# Patient Record
Sex: FEMALE | Race: WHITE | Employment: OTHER | URBAN - METROPOLITAN AREA
[De-identification: names, ages, dates, MRNs, and addresses within clinical notes are randomized per-mention and may not be internally consistent; named-entity substitution may affect disease eponyms.]

---

## 2023-10-25 ENCOUNTER — LAB (OUTPATIENT)
Dept: LAB | Facility: LAB | Age: 63
End: 2023-10-25
Payer: MEDICARE

## 2023-10-25 DIAGNOSIS — Z79.899 OTHER LONG TERM (CURRENT) DRUG THERAPY: Primary | ICD-10-CM

## 2023-10-25 DIAGNOSIS — E66.9 OBESITY, UNSPECIFIED: ICD-10-CM

## 2023-10-25 DIAGNOSIS — E06.3 AUTOIMMUNE THYROIDITIS: ICD-10-CM

## 2023-10-25 DIAGNOSIS — E78.49 OTHER HYPERLIPIDEMIA: ICD-10-CM

## 2023-10-25 DIAGNOSIS — E03.9 HYPOTHYROIDISM, UNSPECIFIED: ICD-10-CM

## 2023-10-25 LAB
ALBUMIN SERPL-MCNC: 4.1 G/DL (ref 3.5–5)
ALP BLD-CCNC: 91 U/L (ref 35–125)
ALT SERPL-CCNC: 11 U/L (ref 5–40)
ANION GAP SERPL CALC-SCNC: 10 MMOL/L
APPEARANCE UR: ABNORMAL
AST SERPL-CCNC: 16 U/L (ref 5–40)
BILIRUB SERPL-MCNC: 0.4 MG/DL (ref 0.1–1.2)
BILIRUB UR STRIP.AUTO-MCNC: NEGATIVE MG/DL
BUN SERPL-MCNC: 13 MG/DL (ref 8–25)
CALCIUM SERPL-MCNC: 8.6 MG/DL (ref 8.5–10.4)
CHLORIDE SERPL-SCNC: 103 MMOL/L (ref 97–107)
CHOLEST SERPL-MCNC: 255 MG/DL (ref 133–200)
CHOLEST/HDLC SERPL: 4 {RATIO}
CO2 SERPL-SCNC: 27 MMOL/L (ref 24–31)
COLOR UR: ABNORMAL
CREAT SERPL-MCNC: 0.7 MG/DL (ref 0.4–1.6)
GFR SERPL CREATININE-BSD FRML MDRD: >90 ML/MIN/1.73M*2
GLUCOSE SERPL-MCNC: 92 MG/DL (ref 65–99)
GLUCOSE UR STRIP.AUTO-MCNC: NORMAL MG/DL
HDLC SERPL-MCNC: 64 MG/DL
KETONES UR STRIP.AUTO-MCNC: NEGATIVE MG/DL
LDLC SERPL CALC-MCNC: 154 MG/DL (ref 65–130)
LEUKOCYTE ESTERASE UR QL STRIP.AUTO: NEGATIVE
NITRITE UR QL STRIP.AUTO: NEGATIVE
PH UR STRIP.AUTO: 5.5 [PH]
POTASSIUM SERPL-SCNC: 4.3 MMOL/L (ref 3.4–5.1)
PROT SERPL-MCNC: 6.4 G/DL (ref 5.9–7.9)
PROT UR STRIP.AUTO-MCNC: NEGATIVE MG/DL
RBC # UR STRIP.AUTO: NEGATIVE /UL
SODIUM SERPL-SCNC: 140 MMOL/L (ref 133–145)
SP GR UR STRIP.AUTO: 1.01
TRIGL SERPL-MCNC: 184 MG/DL (ref 40–150)
TSH SERPL DL<=0.05 MIU/L-ACNC: 3.47 MIU/L (ref 0.27–4.2)
UROBILINOGEN UR STRIP.AUTO-MCNC: NORMAL MG/DL

## 2023-10-25 PROCEDURE — 80053 COMPREHEN METABOLIC PANEL: CPT

## 2023-10-25 PROCEDURE — 80061 LIPID PANEL: CPT

## 2023-10-25 PROCEDURE — 81003 URINALYSIS AUTO W/O SCOPE: CPT

## 2023-10-25 PROCEDURE — 36415 COLL VENOUS BLD VENIPUNCTURE: CPT

## 2023-10-25 PROCEDURE — 84443 ASSAY THYROID STIM HORMONE: CPT

## 2023-11-05 PROBLEM — R51.9 HEADACHE: Status: ACTIVE | Noted: 2023-11-05

## 2023-11-05 PROBLEM — H92.11 EAR DRAINAGE RIGHT: Status: ACTIVE | Noted: 2023-11-05

## 2023-11-05 PROBLEM — E06.3 AUTOIMMUNE THYROIDITIS: Status: ACTIVE | Noted: 2018-06-08

## 2023-11-05 PROBLEM — R31.9 HEMATURIA, UNSPECIFIED: Status: ACTIVE | Noted: 2019-12-18

## 2023-11-05 PROBLEM — H93.8X9 EAR PRESSURE: Status: ACTIVE | Noted: 2023-11-05

## 2023-11-05 PROBLEM — H92.02 REFERRED OTALGIA OF LEFT EAR: Status: ACTIVE | Noted: 2023-11-05

## 2023-11-05 PROBLEM — Z90.79 S/P TAH-BSO: Status: ACTIVE | Noted: 2018-10-04

## 2023-11-05 PROBLEM — E06.3 HASHIMOTO'S DISEASE: Status: ACTIVE | Noted: 2019-05-16

## 2023-11-05 PROBLEM — R11.10 VOMITING: Status: ACTIVE | Noted: 2021-05-06

## 2023-11-05 PROBLEM — N39.0 URINARY TRACT INFECTION: Status: ACTIVE | Noted: 2019-12-18

## 2023-11-05 PROBLEM — Z90.722 S/P TAH-BSO: Status: ACTIVE | Noted: 2018-10-04

## 2023-11-05 PROBLEM — R05.9 COUGH: Status: ACTIVE | Noted: 2018-08-13

## 2023-11-05 PROBLEM — N94.89 VAGINAL BURNING: Status: ACTIVE | Noted: 2019-12-26

## 2023-11-05 PROBLEM — M25.511 RIGHT SHOULDER PAIN: Status: ACTIVE | Noted: 2019-04-22

## 2023-11-05 PROBLEM — E78.5 HYPERLIPIDEMIA: Status: ACTIVE | Noted: 2018-06-08

## 2023-11-05 PROBLEM — R07.81 RIB PAIN ON RIGHT SIDE: Status: ACTIVE | Noted: 2021-10-21

## 2023-11-05 PROBLEM — J38.01 VOCAL CORD PARALYSIS, UNILATERAL COMPLETE: Status: ACTIVE | Noted: 2023-11-05

## 2023-11-05 PROBLEM — L08.9 PUSTULE: Status: ACTIVE | Noted: 2021-12-06

## 2023-11-05 PROBLEM — D44.7 GLOMUS JUGULARE TUMOR (MULTI): Status: ACTIVE | Noted: 2019-10-10

## 2023-11-05 PROBLEM — J11.2 INFLUENZA WITH GASTROINTESTINAL TRACT INVOLVEMENT: Status: ACTIVE | Noted: 2023-11-05

## 2023-11-05 PROBLEM — M26.642 ARTHRITIS OF LEFT TEMPOROMANDIBULAR JOINT: Status: ACTIVE | Noted: 2023-11-05

## 2023-11-05 PROBLEM — R51.9 TEMPORAL PAIN: Status: ACTIVE | Noted: 2023-11-05

## 2023-11-05 PROBLEM — R49.0 HOARSENESS OF VOICE: Status: ACTIVE | Noted: 2023-11-05

## 2023-11-05 PROBLEM — E03.9 HYPOTHYROIDISM: Status: ACTIVE | Noted: 2018-06-08

## 2023-11-05 PROBLEM — N94.9 VAGINAL BURNING: Status: ACTIVE | Noted: 2019-12-26

## 2023-11-05 PROBLEM — S31.109A WOUND OF RIGHT GROIN: Status: ACTIVE | Noted: 2021-12-06

## 2023-11-05 PROBLEM — R09.89 CHEST CONGESTION: Status: ACTIVE | Noted: 2019-05-23

## 2023-11-05 PROBLEM — R07.0 THROAT PAIN: Status: ACTIVE | Noted: 2022-01-26

## 2023-11-05 PROBLEM — N89.8 VAGINAL ITCHING: Status: ACTIVE | Noted: 2019-12-26

## 2023-11-05 PROBLEM — J01.00 ACUTE MAXILLARY SINUSITIS: Status: ACTIVE | Noted: 2021-10-21

## 2023-11-05 PROBLEM — R09.81 SINUS CONGESTION: Status: ACTIVE | Noted: 2018-08-13

## 2023-11-05 PROBLEM — H90.3 SENSORINEURAL HEARING LOSS, BILATERAL: Status: ACTIVE | Noted: 2023-11-05

## 2023-11-05 PROBLEM — D18.00 GLOMUS TUMOR: Status: ACTIVE | Noted: 2023-11-05
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
## 2023-11-05 PROBLEM — R13.14 PHARYNGOESOPHAGEAL PHASE DYSPHAGIA: Status: ACTIVE | Noted: 2023-11-05

## 2023-11-05 PROBLEM — H83.3X3: Status: ACTIVE | Noted: 2023-11-05

## 2023-11-05 PROBLEM — E66.9 OBESITY: Status: ACTIVE | Noted: 2018-06-08

## 2023-11-05 PROBLEM — Z90.710 S/P TAH-BSO: Status: ACTIVE | Noted: 2018-10-04
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
## 2023-11-05 PROBLEM — J30.2 SEASONAL ALLERGIES: Status: ACTIVE | Noted: 2021-10-13

## 2023-11-05 RX ORDER — ONDANSETRON 4 MG/1
4 TABLET, ORALLY DISINTEGRATING ORAL EVERY 6 HOURS PRN
COMMUNITY
Start: 2023-04-14 | End: 2023-11-08

## 2023-11-05 RX ORDER — BEPOTASTINE BESILATE 15 MG/ML
1 SOLUTION/ DROPS OPHTHALMIC 2 TIMES DAILY
COMMUNITY
Start: 2019-06-05

## 2023-11-05 RX ORDER — CLOBETASOL PROPIONATE 0.5 MG/G
1 CREAM TOPICAL
COMMUNITY
Start: 2015-04-22

## 2023-11-05 RX ORDER — FLUCONAZOLE 150 MG/1
TABLET ORAL
COMMUNITY
End: 2023-11-17 | Stop reason: SDUPTHER

## 2023-11-05 RX ORDER — LEVOTHYROXINE SODIUM 75 UG/1
75 TABLET ORAL
COMMUNITY
Start: 2016-04-28 | End: 2023-11-07 | Stop reason: DRUGHIGH

## 2023-11-05 RX ORDER — DOXYCYCLINE 100 MG/1
100 TABLET ORAL 2 TIMES DAILY
COMMUNITY
Start: 2018-03-23 | End: 2023-12-04 | Stop reason: ALTCHOICE

## 2023-11-05 RX ORDER — FLUTICASONE PROPIONATE 50 MCG
2 SPRAY, SUSPENSION (ML) NASAL DAILY
COMMUNITY
Start: 2023-06-30 | End: 2024-02-12 | Stop reason: SDUPTHER

## 2023-11-05 RX ORDER — LEVOTHYROXINE SODIUM 88 UG/1
88 TABLET ORAL DAILY
COMMUNITY
End: 2023-11-07 | Stop reason: SDUPTHER

## 2023-11-05 RX ORDER — DICYCLOMINE HYDROCHLORIDE 20 MG/1
20 TABLET ORAL EVERY 6 HOURS PRN
COMMUNITY
Start: 2023-04-14

## 2023-11-07 ENCOUNTER — OFFICE VISIT (OUTPATIENT)
Dept: PRIMARY CARE | Facility: CLINIC | Age: 63
End: 2023-11-07
Payer: MEDICARE

## 2023-11-07 VITALS
HEIGHT: 66 IN | SYSTOLIC BLOOD PRESSURE: 126 MMHG | WEIGHT: 240 LBS | OXYGEN SATURATION: 98 % | DIASTOLIC BLOOD PRESSURE: 80 MMHG | HEART RATE: 88 BPM | BODY MASS INDEX: 38.57 KG/M2

## 2023-11-07 DIAGNOSIS — D44.7 GLOMUS JUGULARE TUMOR (MULTI): ICD-10-CM

## 2023-11-07 DIAGNOSIS — J32.9 SINUSITIS, UNSPECIFIED CHRONICITY, UNSPECIFIED LOCATION: ICD-10-CM

## 2023-11-07 DIAGNOSIS — R05.1 ACUTE COUGH: Primary | ICD-10-CM

## 2023-11-07 DIAGNOSIS — E03.9 ACQUIRED HYPOTHYROIDISM: ICD-10-CM

## 2023-11-07 DIAGNOSIS — J30.2 SEASONAL ALLERGIC RHINITIS, UNSPECIFIED TRIGGER: ICD-10-CM

## 2023-11-07 DIAGNOSIS — Z00.00 WELL ADULT EXAM: ICD-10-CM

## 2023-11-07 PROCEDURE — 1159F MED LIST DOCD IN RCRD: CPT | Performed by: FAMILY MEDICINE

## 2023-11-07 PROCEDURE — G0402 INITIAL PREVENTIVE EXAM: HCPCS | Performed by: FAMILY MEDICINE

## 2023-11-07 PROCEDURE — 1126F AMNT PAIN NOTED NONE PRSNT: CPT | Performed by: FAMILY MEDICINE

## 2023-11-07 PROCEDURE — 1036F TOBACCO NON-USER: CPT | Performed by: FAMILY MEDICINE

## 2023-11-07 RX ORDER — LEVOTHYROXINE SODIUM 88 UG/1
88 TABLET ORAL DAILY
Qty: 90 TABLET | Refills: 3 | Status: SHIPPED | OUTPATIENT
Start: 2023-11-07 | End: 2024-05-01

## 2023-11-07 RX ORDER — DOXYCYCLINE 100 MG/1
100 CAPSULE ORAL 2 TIMES DAILY
Qty: 14 CAPSULE | Refills: 0 | Status: SHIPPED | OUTPATIENT
Start: 2023-11-07 | End: 2023-11-14

## 2023-11-07 RX ORDER — BENZONATATE 100 MG/1
100 CAPSULE ORAL 3 TIMES DAILY PRN
Qty: 42 CAPSULE | Refills: 0 | Status: SHIPPED | OUTPATIENT
Start: 2023-11-07 | End: 2023-12-04 | Stop reason: SDUPTHER

## 2023-11-07 ASSESSMENT — PATIENT HEALTH QUESTIONNAIRE - PHQ9
1. LITTLE INTEREST OR PLEASURE IN DOING THINGS: NOT AT ALL
SUM OF ALL RESPONSES TO PHQ9 QUESTIONS 1 AND 2: 0
2. FEELING DOWN, DEPRESSED OR HOPELESS: NOT AT ALL

## 2023-11-07 ASSESSMENT — PAIN SCALES - GENERAL: PAINLEVEL: 0-NO PAIN

## 2023-11-07 NOTE — PROGRESS NOTES
Subjective   Patient ID: Bhavana Jean is a 63 y.o. female who presents for Annual Exam.    HPI   Here for a comprehensive physical exam. PMH, PSH, family history and social history were reviewed and updated. Tetanus vaccination status is UTD. She declines an influenza and shingles vaccination at this time.   She is under the care of gynecology and is UTD with cervical and breast cancer screening.   She was told to not get anymore screening colonoscopies due to pancreatitis flare-up. She is checked frequently by her heme/onc physicians for colon cancer via blood work. She had an EKG 2 years ago, and it was normal. She had it completed at  prior to radiation. She had fasting labs completed recently.   Pt has Hypothyroidism: TSH normal, compliant with medication, asymptomatic, needs refill. On levothyroxine 88 micrograms daily.  Pt has Hyperlipidemia: no medication, lipid panel slightly increased this year, admits to not exercising at this time, follows a low fat diet. Has been tried on statin but it flared up her pancreatitis and she had elevated liver enzymes.  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 She is status post multiple operations with reconstructions.   Patent has seasonal allergies and uses steroid nasal spray routinely.  Review of Systems  GENERAL: denies lack of energy, unexplained weight gain or weight loss, loss of appetite, fever, night sweats.  HEENT: denies difficulty with hearing, sinus problems, runny nose, post-nasal drip, ringing in ears, mouth sores, loose teeth, ear pain, nosebleeds, sore throat, facial pain or numbness.  CV: denies irregular heartbeat, racing heart, chest pains, swelling of feet or legs, pain in legs with walking.  RESPIRATORY: denies shortness of breath, prolonged cough, wheezing, sputum production, prior tuberculosis, pleurisy, oxygen at home, coughing up blood, abnormal chest x-ray.  GI: denies heartburn, constipation,  "intolerance to certain foods, diarrhea, abdominal pain, nausea, vomiting, blood in stools, unexplained change in bowel habits, incontinence, swallowing difficulty.  : denies painful urination, frequent urination, urgency, bladder problems.  MS: denies joint pain, aching muscles, swelling of joints, joint deformities, back pain.  INTEGUMENT: denies persistent rash, itching, new skin lesion, change in existing skin lesion, hair loss or increase, breast changes.  NEUROLOGIC: denies frequent headaches, double vision, weakness, change in sensation, problems with walking or balance, dizziness, tremor, loss of consciousness, uncontrolled motions, episodes of visual loss.  PSYCHIATRIC: denies insomnia, irritability, depression, anxiety, recurrent bad thoughts.  ENDOCRINOLOGIC: denies intolerance to heat or cold, menstrual irregularities, frequent hunger/urination/thirst.  HEMATOLOGIC: denies easy bleeding, easy bruising, anemia, leukemia, unexplained swollen areas.  ALLERGIC/IMMUNOLOGIC: denies seasonal allergies, hay fever symptoms, itching, frequent infections.  Objective   /80   Pulse 88   Ht 1.664 m (5' 5.5\")   Wt 109 kg (240 lb)   LMP  (LMP Unknown)   SpO2 98%   BMI 39.33 kg/m²     Physical Exam  General appearance: Well developed, obese, in no acute distress.  Skin: Inspection of the skin reveals no rashes, ulceration, or petechiae.  HEENT: The sclerae were anicteric and conjunctivae were pink and moist. Extraocular movements were intact and pupils were equal, round, and reactive to light with normal accommodation. External inspection of the ears and nose showed no scars, lesions, or masses. EACs clear, TMs translucent, ossicles normal appearance, hearing intact. Nasal mucosa non-inflamed, turbinates normal. Lips, teeth, and gums showed normal mucosa. The oral mucosa, hard and soft palate, tongue and posterior pharynx were normal.  Neck: Supple and symmetric. There was no thyroid enlargement, and no " tenderness, or masses were felt.   Lungs: Auscultation of the lungs revealed normal breath sounds without any other adventitious sounds.  Cardiovascular: There was a regular rate and rhythm without any murmurs, gallops, or rubs. There were no carotid bruits. Peripheral pulses were 2+ and symmetric. Brisk capillary refill.  Abdomen: Soft and nontender with normal bowel sounds. The liver was not enlarged or tender. The spleen was not palpable. There was no umbilical hernia noted. No ascites was noted.  Lymph nodes: No lymphadenopathy was appreciated in the neck.  Musculoskeletal: There was no tenderness or effusions noted. Muscle strength and tone were normal.   Extremities: No cyanosis, clubbing, or edema.  Neurologic: Alert and oriented x 3. Normal affect. Normal deep tendon reflexes with no pathological reflexes. Sensation to touch was normal.   Rectal: Deferred.  Breasts: Deferred.  Gynecological: Deferred.  Assessment/Plan   Problem List Items Addressed This Visit             ICD-10-CM    Hypothyroidism  Stable.  Continue on levothyroxine. E03.9    Glomus jugulare tumor (CMS/HCC)  Chronic.  Patient sees oncology and ENT at . D44.7    Cough - Primary  Needs better control.  Benzonatate. R05.9    Relevant Medications    benzonatate (Tessalon) 100 mg capsule     Other Visit Diagnoses         Codes    Well adult exam    Normal exam. Z00.00    Sinusitis, unspecified chronicity, unspecified location    Needs better control.  Begin antibiotics J32.9    Relevant Medications    doxycycline (Vibramycin) 100 mg capsule    Seasonal allergic rhinitis, unspecified trigger    Chronic, intermittent J30.2

## 2023-11-08 ENCOUNTER — APPOINTMENT (OUTPATIENT)
Dept: RADIOLOGY | Facility: HOSPITAL | Age: 63
End: 2023-11-08
Payer: MEDICARE

## 2023-11-08 ENCOUNTER — HOSPITAL ENCOUNTER (EMERGENCY)
Facility: HOSPITAL | Age: 63
Discharge: HOME | End: 2023-11-08
Attending: STUDENT IN AN ORGANIZED HEALTH CARE EDUCATION/TRAINING PROGRAM
Payer: MEDICARE

## 2023-11-08 ENCOUNTER — TELEPHONE (OUTPATIENT)
Dept: PRIMARY CARE | Facility: CLINIC | Age: 63
End: 2023-11-08
Payer: MEDICARE

## 2023-11-08 VITALS
HEART RATE: 74 BPM | TEMPERATURE: 99 F | BODY MASS INDEX: 39.15 KG/M2 | SYSTOLIC BLOOD PRESSURE: 144 MMHG | WEIGHT: 235 LBS | OXYGEN SATURATION: 96 % | DIASTOLIC BLOOD PRESSURE: 86 MMHG | HEIGHT: 65 IN | RESPIRATION RATE: 18 BRPM

## 2023-11-08 DIAGNOSIS — R11.2 NAUSEA AND VOMITING, UNSPECIFIED VOMITING TYPE: Primary | ICD-10-CM

## 2023-11-08 DIAGNOSIS — B34.9 VIRAL ILLNESS: ICD-10-CM

## 2023-11-08 LAB
ALBUMIN SERPL-MCNC: 4.1 G/DL (ref 3.5–5)
ALP BLD-CCNC: 100 U/L (ref 35–125)
ALT SERPL-CCNC: 15 U/L (ref 5–40)
ANION GAP SERPL CALC-SCNC: 11 MMOL/L
AST SERPL-CCNC: 18 U/L (ref 5–40)
BASOPHILS # BLD AUTO: 0.04 X10*3/UL (ref 0–0.1)
BASOPHILS NFR BLD AUTO: 0.6 %
BILIRUB SERPL-MCNC: 0.5 MG/DL (ref 0.1–1.2)
BUN SERPL-MCNC: 8 MG/DL (ref 8–25)
CALCIUM SERPL-MCNC: 8.7 MG/DL (ref 8.5–10.4)
CHLORIDE SERPL-SCNC: 95 MMOL/L (ref 97–107)
CO2 SERPL-SCNC: 25 MMOL/L (ref 24–31)
CREAT SERPL-MCNC: 0.5 MG/DL (ref 0.4–1.6)
EOSINOPHIL # BLD AUTO: 0.09 X10*3/UL (ref 0–0.7)
EOSINOPHIL NFR BLD AUTO: 1.3 %
ERYTHROCYTE [DISTWIDTH] IN BLOOD BY AUTOMATED COUNT: 12.5 % (ref 11.5–14.5)
GFR SERPL CREATININE-BSD FRML MDRD: >90 ML/MIN/1.73M*2
GLUCOSE SERPL-MCNC: 107 MG/DL (ref 65–99)
HCT VFR BLD AUTO: 42.7 % (ref 36–46)
HGB BLD-MCNC: 13.9 G/DL (ref 12–16)
IMM GRANULOCYTES # BLD AUTO: 0.03 X10*3/UL (ref 0–0.7)
IMM GRANULOCYTES NFR BLD AUTO: 0.4 % (ref 0–0.9)
LIPASE SERPL-CCNC: 26 U/L (ref 16–63)
LYMPHOCYTES # BLD AUTO: 1.24 X10*3/UL (ref 1.2–4.8)
LYMPHOCYTES NFR BLD AUTO: 18.2 %
MCH RBC QN AUTO: 26.9 PG (ref 26–34)
MCHC RBC AUTO-ENTMCNC: 32.6 G/DL (ref 32–36)
MCV RBC AUTO: 83 FL (ref 80–100)
MONOCYTES # BLD AUTO: 0.79 X10*3/UL (ref 0.1–1)
MONOCYTES NFR BLD AUTO: 11.6 %
NEUTROPHILS # BLD AUTO: 4.63 X10*3/UL (ref 1.2–7.7)
NEUTROPHILS NFR BLD AUTO: 67.9 %
NRBC BLD-RTO: 0 /100 WBCS (ref 0–0)
PLATELET # BLD AUTO: 207 X10*3/UL (ref 150–450)
POTASSIUM SERPL-SCNC: 3.5 MMOL/L (ref 3.4–5.1)
PROT SERPL-MCNC: 7.2 G/DL (ref 5.9–7.9)
RBC # BLD AUTO: 5.17 X10*6/UL (ref 4–5.2)
SODIUM SERPL-SCNC: 131 MMOL/L (ref 133–145)
TROPONIN T SERPL-MCNC: <6 NG/L
WBC # BLD AUTO: 6.8 X10*3/UL (ref 4.4–11.3)

## 2023-11-08 PROCEDURE — 96374 THER/PROPH/DIAG INJ IV PUSH: CPT | Mod: 59 | Performed by: STUDENT IN AN ORGANIZED HEALTH CARE EDUCATION/TRAINING PROGRAM

## 2023-11-08 PROCEDURE — 71046 X-RAY EXAM CHEST 2 VIEWS: CPT

## 2023-11-08 PROCEDURE — 74177 CT ABD & PELVIS W/CONTRAST: CPT

## 2023-11-08 PROCEDURE — 96361 HYDRATE IV INFUSION ADD-ON: CPT | Mod: 59 | Performed by: STUDENT IN AN ORGANIZED HEALTH CARE EDUCATION/TRAINING PROGRAM

## 2023-11-08 PROCEDURE — 96375 TX/PRO/DX INJ NEW DRUG ADDON: CPT | Mod: 59 | Performed by: STUDENT IN AN ORGANIZED HEALTH CARE EDUCATION/TRAINING PROGRAM

## 2023-11-08 PROCEDURE — 85025 COMPLETE CBC W/AUTO DIFF WBC: CPT | Performed by: EMERGENCY MEDICINE

## 2023-11-08 PROCEDURE — 2500000004 HC RX 250 GENERAL PHARMACY W/ HCPCS (ALT 636 FOR OP/ED): Performed by: EMERGENCY MEDICINE

## 2023-11-08 PROCEDURE — 82310 ASSAY OF CALCIUM: CPT | Performed by: EMERGENCY MEDICINE

## 2023-11-08 PROCEDURE — 36415 COLL VENOUS BLD VENIPUNCTURE: CPT | Performed by: EMERGENCY MEDICINE

## 2023-11-08 PROCEDURE — 99285 EMERGENCY DEPT VISIT HI MDM: CPT | Mod: 25 | Performed by: STUDENT IN AN ORGANIZED HEALTH CARE EDUCATION/TRAINING PROGRAM

## 2023-11-08 PROCEDURE — 2550000001 HC RX 255 CONTRASTS: Performed by: EMERGENCY MEDICINE

## 2023-11-08 PROCEDURE — 84484 ASSAY OF TROPONIN QUANT: CPT | Performed by: EMERGENCY MEDICINE

## 2023-11-08 PROCEDURE — 83690 ASSAY OF LIPASE: CPT | Performed by: EMERGENCY MEDICINE

## 2023-11-08 PROCEDURE — 2500000005 HC RX 250 GENERAL PHARMACY W/O HCPCS: Performed by: STUDENT IN AN ORGANIZED HEALTH CARE EDUCATION/TRAINING PROGRAM

## 2023-11-08 RX ORDER — FAMOTIDINE 10 MG/ML
20 INJECTION INTRAVENOUS ONCE
Status: COMPLETED | OUTPATIENT
Start: 2023-11-08 | End: 2023-11-08

## 2023-11-08 RX ORDER — ONDANSETRON HYDROCHLORIDE 2 MG/ML
4 INJECTION, SOLUTION INTRAVENOUS ONCE
Status: COMPLETED | OUTPATIENT
Start: 2023-11-08 | End: 2023-11-08

## 2023-11-08 RX ORDER — ONDANSETRON 4 MG/1
4 TABLET, ORALLY DISINTEGRATING ORAL ONCE
Status: COMPLETED | OUTPATIENT
Start: 2023-11-08 | End: 2023-11-08

## 2023-11-08 RX ORDER — ONDANSETRON 4 MG/1
4 TABLET, ORALLY DISINTEGRATING ORAL EVERY 8 HOURS PRN
Qty: 15 TABLET | Refills: 0 | Status: SHIPPED | OUTPATIENT
Start: 2023-11-08 | End: 2024-03-13 | Stop reason: ALTCHOICE

## 2023-11-08 RX ADMIN — IOHEXOL 75 ML: 350 INJECTION, SOLUTION INTRAVENOUS at 20:31

## 2023-11-08 RX ADMIN — FAMOTIDINE 20 MG: 10 INJECTION, SOLUTION INTRAVENOUS at 19:23

## 2023-11-08 RX ADMIN — ONDANSETRON 4 MG: 4 TABLET, ORALLY DISINTEGRATING ORAL at 21:13

## 2023-11-08 RX ADMIN — ONDANSETRON 4 MG: 2 INJECTION INTRAMUSCULAR; INTRAVENOUS at 19:24

## 2023-11-08 RX ADMIN — SODIUM CHLORIDE 1000 ML: 900 INJECTION, SOLUTION INTRAVENOUS at 19:24

## 2023-11-08 ASSESSMENT — COLUMBIA-SUICIDE SEVERITY RATING SCALE - C-SSRS
2. HAVE YOU ACTUALLY HAD ANY THOUGHTS OF KILLING YOURSELF?: NO
1. IN THE PAST MONTH, HAVE YOU WISHED YOU WERE DEAD OR WISHED YOU COULD GO TO SLEEP AND NOT WAKE UP?: NO
6. HAVE YOU EVER DONE ANYTHING, STARTED TO DO ANYTHING, OR PREPARED TO DO ANYTHING TO END YOUR LIFE?: NO

## 2023-11-08 ASSESSMENT — PAIN SCALES - GENERAL
PAINLEVEL_OUTOF10: 1
PAINLEVEL_OUTOF10: 8

## 2023-11-08 ASSESSMENT — PAIN - FUNCTIONAL ASSESSMENT: PAIN_FUNCTIONAL_ASSESSMENT: 0-10

## 2023-11-09 NOTE — ED PROVIDER NOTES
HPI   Chief Complaint   Patient presents with    Vomiting     Pt states she has had a headache, bilat ear pain for 2 weeks.  States she was dx with a sinus and ear infection by her pcp.  Started taking medication yesterday.  States 2 days she has been having n/v and abd/chest pain.     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    Personal history of other endocrine, nutritional and metabolic disease     History of thyroid disease    Personal history of other specified conditions     History of blood loss     Past Surgical History:   Procedure Laterality Date    ADENOIDECTOMY  08/25/2014    Adenoidectomy    GALLBLADDER SURGERY  08/25/2014    Gallbladder Surgery    HYSTERECTOMY  08/25/2014    Hysterectomy    OTHER SURGICAL HISTORY  08/25/2014    Leg Repair    OTHER SURGICAL HISTORY  08/25/2014    Ear Surgery    TONSILLECTOMY  08/25/2014    Tonsillectomy     Family History   Problem Relation Name Age of Onset    Colon cancer Mother       Social History     Tobacco Use    Smoking status: Never    Smokeless tobacco: Never   Vaping Use    Vaping Use: Never used   Substance Use Topics    Alcohol use: Never    Drug use: Never       Physical Exam   ED Triage Vitals [11/08/23 1858]   Temp Heart Rate Resp BP   37.2 °C (99 °F) 79 18 144/86      SpO2 Temp Source Heart Rate Source Patient Position   95 % Oral -- --      BP Location FiO2 (%)     -- --       Physical Exam  See [facility]  ED Course & [facility]   Diagnoses as of 12/02/23 1402   Nausea and vomiting, unspecified vomiting type   Viral illness       Medical Decision Making  63-year-old female no significant past medical history presents emergency room with vomiting.  Patient had several episodes of emesis today that were nonbloody and nonbilious and a single episode of  vomiting yesterday.  Patient saw her primary care provider and was diagnosed with a sinus infection and took the first dose of antibiotics yesterday.  Patient otherwise has been moving her bowels with no significant diarrhea and otherwise denies any significant fevers or chills, chest pain, shortness of breath.    Vital signs reviewed: Afebrile, 79 bpm, mildly hypertensive, 95% on room air    Exam     Constitutional: No acute distress. Resting comfortably.   Head: Normocephalic, atraumatic.   Eyes: Pupils equal bilaterally, EOM grossly intact, conjunctiva normal.  Mouth/Throat: Oropharynx is clear, moist mucus membranes.   Neck: Supple. No lymphadenopathy.  Cardiovascular: Regular rate and regular rhythm. Extremities are well-perfused.   Pulmonary/Chest: No respiratory distress, breathing comfortably on room air.    Abdominal: Soft, non-tender, non-distended. No rebound or guarding.   Musculoskeletal: No lower extremity edema.       Skin: Warm, dry, and intact.   Neurological: Patient is oriented to person, place, time, and situation. Face symmetric, hearing intact to voice, speech normal. Moves all extremities.     Differential includes but is not limited to:  Pancreatitis versus nephrolithiasis versus appendicitis versus gastroenteritis    Amount and/or Complexity of Data Reviewed  External Data Reviewed: notes.      Labs: ordered.    Radiology: ordered and independent interpretation performed.  CT abdomen pelvis as interpreted by me shows no large dilated loops of bowel at this time.    ECG/medicine tests: ordered and independent interpretation performed.  Lab work reviewed as interpreted by me shows no significant CBC abnormalities such as leukocytosis or anemia, CMP is noted to be within normal limits with no significant LFT abnormalities and initial troponin is negative.  Patient lipase is unremarkable and patient otherwise denies any urinary symptoms and will forego urinalysis at this time.    CT abdomen  pelvis and chest x-ray per radiology are unremarkable with no acute findings suggestive of pneumonia or intra-abdominal pathology.    Patient likely suffering from a viral gastroenteritis and otherwise hemodynamically stable and afebrile and will continue supportive care at home.    PLAN AND FOLLOW-UP: Patient counseled on all findings, diagnosis and treatment plan. Patient's questions and concerns addressed. Patient stable, discharged with instructions to follow up with PMD, and to return to ED at any time for worsening symptoms or any other concerns. Patient demonstrates understanding of the findings and the importance of appropriate follow up care.    Procedure  Procedures     Jack Barney MD  11/08/23 7024       Jcak Barney MD  12/02/23 8413

## 2023-11-09 NOTE — ED TRIAGE NOTES
HPI   Chief Complaint   Patient presents with    Vomiting     Pt states she has had a headache, bilat ear pain for 2 weeks.  States she was dx with a sinus and ear infection by her pcp.  Started taking medication yesterday.  States 2 days she has been having n/v and abd/chest pain.     Abdominal Pain        TRIAGE NOTE   I saw the patient as the Clinician in Triage and performed a brief history and physical exam, established acuity, and ordered appropriate tests to develop basic plan of care. Patient will be seen by an [facility], resident and/or physician who will independently evaluate the patient. Please see subsequent provider notes for further details and disposition.     Brief HPI: In brief, Bhavana Jean is a 63 y.o. female that presents for evaluation of abdominal pain and vomiting.  The patient reports pain across her upper abdomen along with nausea and vomiting that began yesterday afternoon.  There is been no hematemesis.  No fevers.  The pain radiates upward into her chest.  No shortness of breath.  She denies any diarrhea.  No lower abdominal pain.  She has had multiple episodes of vomiting today.  She was started on doxycycline yesterday for treatment of a sinus infection but states the abdominal pain and vomiting actually began before she started the doxycycline.    Focused Physical exam:   Triage vitals are unremarkable.  Heart rate is in the 70s with regular rhythm and no murmurs.  Lungs are clear to auscultation bilaterally.  The abdomen is soft and nondistended and mildly tender diffusely across both upper quadrants in the epigastrium.  Lower quadrants are nontender.  No guarding or rigidity.    Plan/MDM:   Plan is for IV fluids, IV Zofran and Pepcid, EKG, labs, chest x-ray and CT abdomen and pelvis.  Please see subsequent provider note for further details and disposition     Nelson Harris D.O.  7:05 PM                          No data recorded                Patient History   Past Medical History:    Diagnosis Date    Acquired absence of both cervix and uterus     H/O: hysterectomy    Personal history of other endocrine, nutritional and metabolic disease     History of thyroid disease    Personal history of other specified conditions     History of blood loss     Past Surgical History:   Procedure Laterality Date    ADENOIDECTOMY  08/25/2014    Adenoidectomy    GALLBLADDER SURGERY  08/25/2014    Gallbladder Surgery    HYSTERECTOMY  08/25/2014    Hysterectomy    OTHER SURGICAL HISTORY  08/25/2014    Leg Repair    OTHER SURGICAL HISTORY  08/25/2014    Ear Surgery    TONSILLECTOMY  08/25/2014    Tonsillectomy     No family history on file.  Social History     Tobacco Use    Smoking status: Never    Smokeless tobacco: Never   Substance Use Topics    Alcohol use: Not on file    Drug use: Not on file       Physical Exam   ED Triage Vitals [11/08/23 1858]   Temp Heart Rate Resp BP   37.2 °C (99 °F) 79 18 144/86      SpO2 Temp Source Heart Rate Source Patient Position   95 % Oral -- --      BP Location FiO2 (%)     -- --       Physical Exam    ED Course & MDM   Diagnoses as of 11/10/23 2008   Nausea and vomiting, unspecified vomiting type   Viral illness       Medical Decision Making      Procedure  Procedures

## 2023-11-17 ENCOUNTER — TELEPHONE (OUTPATIENT)
Dept: RADIATION ONCOLOGY | Facility: HOSPITAL | Age: 63
End: 2023-11-17
Payer: MEDICARE

## 2023-11-17 ENCOUNTER — TELEPHONE (OUTPATIENT)
Dept: PRIMARY CARE | Facility: CLINIC | Age: 63
End: 2023-11-17
Payer: MEDICARE

## 2023-11-17 DIAGNOSIS — N39.0 URINARY TRACT INFECTION WITHOUT HEMATURIA, SITE UNSPECIFIED: ICD-10-CM

## 2023-11-17 RX ORDER — FLUCONAZOLE 150 MG/1
TABLET ORAL
Qty: 2 TABLET | Refills: 0 | Status: SHIPPED | OUTPATIENT
Start: 2023-11-17 | End: 2023-12-04 | Stop reason: ALTCHOICE

## 2023-11-17 NOTE — TELEPHONE ENCOUNTER
Called pt to remind of appointment on 11/20/2023 at 2:30. Pt's phone went to voicemail left number if needs to reschedule.

## 2023-11-19 ENCOUNTER — HOSPITAL ENCOUNTER (OUTPATIENT)
Dept: CARDIOLOGY | Facility: HOSPITAL | Age: 63
Discharge: HOME | End: 2023-11-19
Payer: MEDICARE

## 2023-11-19 LAB
ATRIAL RATE: 74 BPM
P AXIS: 17 DEGREES
P OFFSET: 207 MS
P ONSET: 146 MS
PR INTERVAL: 148 MS
Q ONSET: 220 MS
QRS COUNT: 12 BEATS
QRS DURATION: 82 MS
QT INTERVAL: 432 MS
QTC CALCULATION(BAZETT): 479 MS
QTC FREDERICIA: 463 MS
R AXIS: 17 DEGREES
T AXIS: 16 DEGREES
T OFFSET: 436 MS
VENTRICULAR RATE: 74 BPM

## 2023-11-19 PROCEDURE — 93005 ELECTROCARDIOGRAM TRACING: CPT

## 2023-11-20 ENCOUNTER — HOSPITAL ENCOUNTER (OUTPATIENT)
Dept: RADIATION ONCOLOGY | Facility: HOSPITAL | Age: 63
Setting detail: RADIATION/ONCOLOGY SERIES
Discharge: HOME | End: 2023-11-20
Payer: MEDICARE

## 2023-11-20 VITALS
TEMPERATURE: 96.6 F | RESPIRATION RATE: 18 BRPM | HEIGHT: 65 IN | OXYGEN SATURATION: 96 % | SYSTOLIC BLOOD PRESSURE: 136 MMHG | WEIGHT: 237.22 LBS | DIASTOLIC BLOOD PRESSURE: 78 MMHG | BODY MASS INDEX: 39.52 KG/M2 | HEART RATE: 70 BPM

## 2023-11-20 DIAGNOSIS — D44.7 GLOMUS JUGULARE TUMOR (MULTI): Primary | ICD-10-CM

## 2023-11-20 ASSESSMENT — ENCOUNTER SYMPTOMS
DEPRESSION: 0
ENDOCRINE NEGATIVE: 1
LOSS OF SENSATION IN FEET: 0
CARDIOVASCULAR NEGATIVE: 1
MUSCULOSKELETAL NEGATIVE: 1
GASTROINTESTINAL NEGATIVE: 1
CONSTITUTIONAL NEGATIVE: 1
HEMATOLOGIC/LYMPHATIC NEGATIVE: 1
PSYCHIATRIC NEGATIVE: 1
NEUROLOGICAL NEGATIVE: 1
OCCASIONAL FEELINGS OF UNSTEADINESS: 0
RESPIRATORY NEGATIVE: 1
EYES NEGATIVE: 1

## 2023-11-20 ASSESSMENT — COLUMBIA-SUICIDE SEVERITY RATING SCALE - C-SSRS
1. IN THE PAST MONTH, HAVE YOU WISHED YOU WERE DEAD OR WISHED YOU COULD GO TO SLEEP AND NOT WAKE UP?: NO
2. HAVE YOU ACTUALLY HAD ANY THOUGHTS OF KILLING YOURSELF?: NO
6. HAVE YOU EVER DONE ANYTHING, STARTED TO DO ANYTHING, OR PREPARED TO DO ANYTHING TO END YOUR LIFE?: NO

## 2023-11-20 ASSESSMENT — PATIENT HEALTH QUESTIONNAIRE - PHQ9
SUM OF ALL RESPONSES TO PHQ9 QUESTIONS 1 AND 2: 0
2. FEELING DOWN, DEPRESSED OR HOPELESS: NOT AT ALL
1. LITTLE INTEREST OR PLEASURE IN DOING THINGS: NOT AT ALL

## 2023-11-20 ASSESSMENT — PAIN SCALES - GENERAL: PAINLEVEL: 1

## 2023-11-20 NOTE — PROGRESS NOTES
Radiation Oncology Follow-Up    Patient Name:  Bhavana Jean  MRN:  [id number]  :  1960    Referring Provider: No ref. provider found  Primary Care Provider: Mathew Rodriguez MD  Care Team: Patient Care Team:  Mathew Rodriguez MD as PCP - General  Mathew Rodriguez MD as PCP - O Medicare Advantage PCP    Date of Service: 2023     SUBJECTIVE  History of Present Illness:   Bhavana Jean is a 63 y.o. female who was previously seen at the [facility] Department of Radiation Oncology for continued post radiation treatment follow-up for a glomus tumor.      Ms. Jean is a 63 year old female with a longstanding history of a glomus tumor. 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 She then developed a recurrence on a CT IAC from 2009, and underwent a re-resection on 2009. CT IAC on 2017 demonstrated disease progression from her prior CT IAC on 2009. She then  underwent a MRI IAC study on 2017 that demonstrated disease progression with extension into the left para-pharyngeal space and left skull base. MRI brain on 2017 again demonstrated disease progression with extension into the left skull base, but was felt to be similar in size to her MRI IAC from 2017. She underwent surgical resection using tympanomastoidectomy with extended facial recess approach 10/2017. She then underwent adjuvant radiation treatment to a total dose of 5040 cGy in 28 fractions from 2017 to 2018. Post treatment MRI imaging of the IAC and neck in , , and  demonstrated that the mass remained unchanged in size. MRI IAC and Neck 2021 demonstrated a slight overall decrease in the size of a previously noted mass. Repeat MRI neck and IAC on 2022 demonstrated that her left skull base glomus tumor was unchanged from prior exams. She was last seen for radiation  "oncology follow-up on 05/23/2022 with Dr. Anthony, and recommended for an 18 month follow-up with MRI imaging beforehand. She was seen by Dr. Walters from ENT on 12/05/2022 with recommended follow-up in 6-8 months.    She now presents to radiation oncology clinic for continued follow-up. She was unable to obtain a repeat MRI brain or MRI IAC due to insurance issues. She continues to experience dry mouth, but states that it is not bothersome. She continues to experience headaches that are related to the weather. She also continues to experience ringing and fullness in her left ear that started prior to her radiation treatment. She denies any vision changes, speech changes, focal limb weakness, seizure activity, worsening headaches, nausea, vomiting, chest pain, shortness of breath, or weight loss.    Treatment Rendered:   Left neck: 5040 cGy in 28 fractions from 12/26/2017 to 02/02/2018.    Review of Systems:   Review of Systems   Constitutional: Negative.    HENT:  Negative.     Eyes: Negative.    Respiratory: Negative.     Cardiovascular: Negative.    Gastrointestinal: Negative.    Endocrine: Negative.    Genitourinary: Negative.     Musculoskeletal: Negative.    Skin: Negative.    Neurological: Negative.    Hematological: Negative.    Psychiatric/Behavioral: Negative.       The patient's current pain level was assessed.  They report currently having a pain of 0 out of 10.  They feel their pain is under control without the use of pain medications.    Performance Status:   The Karnofsky performance scale today is 90, Able to carry on normal activity; minor signs or symptoms of disease (ECOG equivalent 0).        OBJECTIVE  Vital Signs:  /78 (BP Location: Right arm, Patient Position: Standing, BP Cuff Size: Large adult)   Pulse 70   Temp 35.9 °C (96.6 °F) (Temporal)   Resp 18   Ht 1.651 m (5' 5\")   Wt 108 kg (237 lb 3.4 oz)   LMP  (LMP Unknown)   SpO2 96%   BMI 39.47 kg/m²    Physical Exam:  Physical " Exam  Constitutional:       General: She is not in acute distress.     Appearance: Normal appearance.   HENT:      Head: Normocephalic and atraumatic.      Mouth/Throat:      Mouth: Mucous membranes are moist.   Eyes:      Extraocular Movements: Extraocular movements intact.      Pupils: Pupils are equal, round, and reactive to light.   Cardiovascular:      Rate and Rhythm: Normal rate and regular rhythm.      Heart sounds: Normal heart sounds.   Pulmonary:      Effort: Pulmonary effort is normal. No respiratory distress.      Breath sounds: Normal breath sounds.   Abdominal:      General: There is no distension.      Palpations: Abdomen is soft. There is no mass.      Tenderness: There is no abdominal tenderness.   Musculoskeletal:         General: No swelling or tenderness.      Cervical back: Normal range of motion.   Neurological:      General: No focal deficit present.      Mental Status: She is alert and oriented to person, place, and time.      Cranial Nerves: Cranial nerves 2-12 are intact.      Sensory: Sensation is intact.      Motor: Motor function is intact.      Comments: Left hypoglossal nerve deficit   Psychiatric:         Mood and Affect: Mood normal.           ASSESSMENT:  Bhavana Jean is a 63 y.o. female with a glomus jugulare tumor that has been resected three times. Her most recent resection was on 10/2017, where she underwent tympanomastoidectomy with extended facial recess approach. She then underwent adjuvant radiation treatment to a total dose of 5040 cGy in 28 fractions from 12/26/2017 to 02/02/2018. She tolerated treatment well with acute grade 1-2 radiation induced dry mouth and dermatitis. She was followed by yearly MRI imaging of her neck and internal auditory canal that demonstrated that the remaining glomus tumor was stable in size. Her last follow-up with radiation oncology was on 05/2022, and her last follow-up with ENT was on 12/2022. She was noted to be doing well at these  follow-up visits with no treatment related side effects. MRI imaging on 04/2022 demonstrated that her tumor was stable in size.    Today she continues to do well 5 years after completing treatment. She continues to have dry mouth, but is able to tolerate dry mouth with oral hydration. She was unable to obtain repeat MRI IAC and neck studies due to insurance issues.     We do not anticipate any new growth of her glomus tumor, but would like to obtain a repeat MRI IAC and MRI neck to ensure that there is no additional growth. If she continues to have stable disease we will recommend continued follow-up with ENT, and she can follow-up with radiation oncology as needed.    PLAN:      -Ordered MRI IAC and neck, and patient made aware  -Follow-up with radiation oncology as needed. Continue to follow with ENT as scheduled.    NCCN Guidelines were not applicable to guide this patients treatment plan.    Thank you for allowing us to participate in the care of this kind patient.  Patient seen and discussed with attending physician Dr. Anthony.  Robin Desouza MD PGY5 Radiation Oncology

## 2023-12-04 ENCOUNTER — OFFICE VISIT (OUTPATIENT)
Dept: PRIMARY CARE | Facility: CLINIC | Age: 63
End: 2023-12-04
Payer: MEDICARE

## 2023-12-04 VITALS
TEMPERATURE: 97.7 F | HEART RATE: 71 BPM | BODY MASS INDEX: 38.82 KG/M2 | SYSTOLIC BLOOD PRESSURE: 126 MMHG | WEIGHT: 233 LBS | HEIGHT: 65 IN | DIASTOLIC BLOOD PRESSURE: 88 MMHG | OXYGEN SATURATION: 94 %

## 2023-12-04 DIAGNOSIS — R05.1 ACUTE COUGH: ICD-10-CM

## 2023-12-04 DIAGNOSIS — J01.41 ACUTE RECURRENT PANSINUSITIS: Primary | ICD-10-CM

## 2023-12-04 DIAGNOSIS — B37.9 YEAST INFECTION: ICD-10-CM

## 2023-12-04 PROCEDURE — 1160F RVW MEDS BY RX/DR IN RCRD: CPT

## 2023-12-04 PROCEDURE — 1036F TOBACCO NON-USER: CPT

## 2023-12-04 PROCEDURE — 99213 OFFICE O/P EST LOW 20 MIN: CPT

## 2023-12-04 PROCEDURE — 1159F MED LIST DOCD IN RCRD: CPT

## 2023-12-04 PROCEDURE — 1125F AMNT PAIN NOTED PAIN PRSNT: CPT

## 2023-12-04 RX ORDER — BENZONATATE 100 MG/1
100 CAPSULE ORAL 3 TIMES DAILY PRN
Qty: 42 CAPSULE | Refills: 0 | Status: SHIPPED | OUTPATIENT
Start: 2023-12-04 | End: 2024-01-03

## 2023-12-04 RX ORDER — LEVOFLOXACIN 500 MG/1
500 TABLET, FILM COATED ORAL DAILY
Qty: 10 TABLET | Refills: 0 | Status: SHIPPED | OUTPATIENT
Start: 2023-12-04 | End: 2023-12-14

## 2023-12-04 RX ORDER — FLUCONAZOLE 150 MG/1
150 TABLET ORAL ONCE
Qty: 1 TABLET | Refills: 1 | Status: SHIPPED | OUTPATIENT
Start: 2023-12-04 | End: 2023-12-04

## 2023-12-04 ASSESSMENT — ENCOUNTER SYMPTOMS
PALPITATIONS: 0
CHEST TIGHTNESS: 0
SWOLLEN GLANDS: 0
NECK PAIN: 0
SORE THROAT: 0
FATIGUE: 0
TROUBLE SWALLOWING: 0
SINUS PRESSURE: 1
SINUS PAIN: 1
DIAPHORESIS: 0
APPETITE CHANGE: 0
MYALGIAS: 0
COLOR CHANGE: 0
COUGH: 1
LIGHT-HEADEDNESS: 0
HEADACHES: 1
ACTIVITY CHANGE: 0
HOARSE VOICE: 0
DIZZINESS: 0
SHORTNESS OF BREATH: 0
FEVER: 0
RHINORRHEA: 1
CHILLS: 0
ARTHRALGIAS: 0
WHEEZING: 0

## 2023-12-04 ASSESSMENT — PAIN SCALES - GENERAL: PAINLEVEL: 4

## 2023-12-04 NOTE — PROGRESS NOTES
Subjective   Patient ID: Bhavana Jean is a 63 y.o. female who presents for Cough (X 1 month ) and Nasal Congestion.    Bhavana presents with recurrent nasal and sinus pain and congestion with cough. She was seen by her PCP, TANNA, on 11/7/23 for similar symptoms.  She was treated with a course of doxycycline, which she reports did improve her symptoms for a short time by they have now returned.  She reports she is using Claritin daily as well as Flonase nasal spray but that is drying out her nasal passage, causing nose bleeds but not improving the sinus congestion.     Sinusitis  This is a recurrent problem. The current episode started more than 1 month ago. The problem has been waxing and waning since onset. There has been no fever. Her pain is at a severity of 4/10. The pain is moderate. Associated symptoms include congestion, coughing, ear pain, headaches and sinus pressure. Pertinent negatives include no chills, diaphoresis, hoarse voice, neck pain, shortness of breath, sneezing, sore throat or swollen glands. Past treatments include oral decongestants, saline nose sprays, nasal decongestants, acetaminophen and antibiotics. The treatment provided mild relief.        Review of Systems   Constitutional:  Negative for activity change, appetite change, chills, diaphoresis, fatigue and fever.   HENT:  Positive for congestion, ear pain, postnasal drip, rhinorrhea, sinus pressure and sinus pain. Negative for hoarse voice, sneezing, sore throat and trouble swallowing.    Respiratory:  Positive for cough. Negative for chest tightness, shortness of breath and wheezing.    Cardiovascular:  Negative for chest pain and palpitations.   Musculoskeletal:  Negative for arthralgias, myalgias and neck pain.   Skin:  Negative for color change and rash.   Neurological:  Positive for headaches. Negative for dizziness and light-headedness.       Objective   Blood Pressure 126/88 (BP Location: Right arm)   Pulse 71   Temperature  "36.5 °C (97.7 °F) (Temporal)   Height 1.651 m (5' 5\")   Weight 106 kg (233 lb)   Last Menstrual Period  (LMP Unknown)   Oxygen Saturation 94%   Body Mass Index 38.77 kg/m²     Physical Exam  Vitals and nursing note reviewed.   Constitutional:       General: She is not in acute distress.     Appearance: Normal appearance. She is not ill-appearing or toxic-appearing.   HENT:      Right Ear: Hearing, ear canal and external ear normal. A middle ear effusion is present.      Left Ear: Hearing, ear canal and external ear normal. A middle ear effusion is present.      Nose: Nasal tenderness, mucosal edema, congestion and rhinorrhea present. Rhinorrhea is purulent.      Right Turbinates: Swollen.      Left Turbinates: Swollen.      Right Sinus: Maxillary sinus tenderness and frontal sinus tenderness present.      Left Sinus: Maxillary sinus tenderness and frontal sinus tenderness present.      Mouth/Throat:      Lips: Pink.      Mouth: Mucous membranes are moist.      Pharynx: Oropharynx is clear. Uvula midline. Posterior oropharyngeal erythema present. No pharyngeal swelling, oropharyngeal exudate or uvula swelling.   Neck:      Trachea: Trachea and phonation normal.   Cardiovascular:      Rate and Rhythm: Normal rate and regular rhythm.      Heart sounds: Normal heart sounds, S1 normal and S2 normal.   Pulmonary:      Effort: Pulmonary effort is normal.      Breath sounds: Normal breath sounds and air entry. No decreased breath sounds, wheezing, rhonchi or rales.   Musculoskeletal:      Cervical back: Full passive range of motion without pain, normal range of motion and neck supple.   Lymphadenopathy:      Cervical: No cervical adenopathy.   Skin:     General: Skin is warm and dry.   Neurological:      General: No focal deficit present.      Mental Status: She is alert.   Psychiatric:         Behavior: Behavior is cooperative.             Assessment/Plan   Problem List Items Addressed This Visit             ICD-10-CM "    Cough  May use tessalon as needed for symptom relief.  R05.9    Relevant Medications    benzonatate (Tessalon) 100 mg capsule     Other Visit Diagnoses       Diagnosis Codes    Acute recurrent pansinusitis    -  Primary  Start antibiotics as directed and finish full course.  Reviewed risks, side effects, and expected treatment course.  May use OTC tylenol/ibuprofen, nasal or oral decongestants for symptom control as discussed.   May use saline nasal spray as discussed to thin mucus and relieve nasal symptoms.  Call if not rapidly improving over the next 5-7 days.     J01.41    Relevant Medications    levoFLOXacin (Levaquin) 500 mg tablet    Yeast infection      Given as preventative to be taking id yeast infection symptoms present as patient voices she does have yeast over growth when taking antibiotics.  B37.9    Relevant Medications    fluconazole (Diflucan) 150 mg tablet

## 2023-12-07 ENCOUNTER — APPOINTMENT (OUTPATIENT)
Dept: RADIOLOGY | Facility: CLINIC | Age: 63
End: 2023-12-07
Payer: MEDICARE

## 2023-12-13 ENCOUNTER — APPOINTMENT (OUTPATIENT)
Dept: RADIOLOGY | Facility: HOSPITAL | Age: 63
End: 2023-12-13
Payer: MEDICARE

## 2024-02-12 ENCOUNTER — TELEPHONE (OUTPATIENT)
Dept: PRIMARY CARE | Facility: CLINIC | Age: 64
End: 2024-02-12
Payer: MEDICARE

## 2024-02-12 DIAGNOSIS — J01.41 ACUTE RECURRENT PANSINUSITIS: ICD-10-CM

## 2024-02-12 RX ORDER — FLUTICASONE PROPIONATE 50 MCG
2 SPRAY, SUSPENSION (ML) NASAL DAILY
Qty: 16 G | Refills: 1 | Status: SHIPPED | OUTPATIENT
Start: 2024-02-12

## 2024-02-12 NOTE — TELEPHONE ENCOUNTER
Rx Line    Name:  Bhavana Jean  :  [id number]  Medication Name:  Flonase   50 mcg    Specific Pharmacy location:  [facility]    Best number to reach patient:  Home

## 2024-03-13 ENCOUNTER — OFFICE VISIT (OUTPATIENT)
Dept: PRIMARY CARE | Facility: CLINIC | Age: 64
End: 2024-03-13
Payer: MEDICARE

## 2024-03-13 VITALS
TEMPERATURE: 97 F | DIASTOLIC BLOOD PRESSURE: 80 MMHG | SYSTOLIC BLOOD PRESSURE: 146 MMHG | BODY MASS INDEX: 38.77 KG/M2 | OXYGEN SATURATION: 98 % | HEART RATE: 88 BPM | WEIGHT: 233 LBS

## 2024-03-13 DIAGNOSIS — R05.1 ACUTE COUGH: ICD-10-CM

## 2024-03-13 DIAGNOSIS — B37.31 YEAST VAGINITIS: ICD-10-CM

## 2024-03-13 DIAGNOSIS — J01.00 ACUTE NON-RECURRENT MAXILLARY SINUSITIS: Primary | ICD-10-CM

## 2024-03-13 PROCEDURE — 1159F MED LIST DOCD IN RCRD: CPT | Performed by: FAMILY MEDICINE

## 2024-03-13 PROCEDURE — 99213 OFFICE O/P EST LOW 20 MIN: CPT | Performed by: FAMILY MEDICINE

## 2024-03-13 PROCEDURE — 1036F TOBACCO NON-USER: CPT | Performed by: FAMILY MEDICINE

## 2024-03-13 PROCEDURE — 1125F AMNT PAIN NOTED PAIN PRSNT: CPT | Performed by: FAMILY MEDICINE

## 2024-03-13 RX ORDER — FLUCONAZOLE 150 MG/1
150 TABLET ORAL ONCE
Qty: 1 TABLET | Refills: 0 | Status: SHIPPED | OUTPATIENT
Start: 2024-03-13 | End: 2024-03-13

## 2024-03-13 RX ORDER — BENZONATATE 100 MG/1
100 CAPSULE ORAL 3 TIMES DAILY PRN
Qty: 42 CAPSULE | Refills: 0 | Status: SHIPPED | OUTPATIENT
Start: 2024-03-13 | End: 2024-04-12

## 2024-03-13 RX ORDER — DOXYCYCLINE 100 MG/1
100 CAPSULE ORAL 2 TIMES DAILY
Qty: 20 CAPSULE | Refills: 0 | Status: SHIPPED | OUTPATIENT
Start: 2024-03-13 | End: 2024-03-23

## 2024-03-13 ASSESSMENT — PAIN SCALES - GENERAL: PAINLEVEL: 8

## 2024-03-13 NOTE — PROGRESS NOTES
Subjective   Patient ID: Bhavana Jean is a 63 y.o. female who presents for Sinus Problem and Earache (Sx started 4-5 days ago).    HPI here with a 5-day history of pain in the left ear with sinus pain and pressure.  She is status post posterior jugulare tumor and reconstruction.  She has a history of ear pain with sinus infections.    Review of Systems  Constitutional: Patient is negative for fever, fatigue, weight change.  HEENT: Patient is positive for left ear pain with sinus pain and pressure.   Patient is negative for change in vision, hearing, swallow.  Cardio: Patient is negative for chest pain, lower extremity edema.  Pulmonary: Patient is negative for cough, shortness of breath.  Objective   /80   Pulse 88   Temp 36.1 °C (97 °F)   Wt 106 kg (233 lb)   LMP  (LMP Unknown)   SpO2 98%   BMI 38.77 kg/m²     Physical Exam  General: Awake and alert no apparent distress.  HEENT: Moist oral mucosa no cervical lymphadenopathy.  Right TM with good color and light reflex.  Left TM is not well-visualized.  Cardio: Heart S1-S2 no murmur rub or gallop.  Pulmonary: Lungs clear to auscultation bilaterally  Assessment/Plan   Problem List Items Addressed This Visit             ICD-10-CM    Cough needs better control.  Will give benzonatate. R05.9    Relevant Medications    benzonatate (Tessalon) 100 mg capsule    Acute maxillary sinusitis - Primary needs better control.  Begin antibiotics. J01.00    Relevant Medications    doxycycline (Vibramycin) 100 mg capsule     Other Visit Diagnoses         Codes    Yeast vaginitis    needs better control.  Patient tends to get yeast vaginitis after antibiotic use.  I will give her Diflucan to use after completion of her antibiotics course B37.31    Relevant Medications    fluconazole (Diflucan) 150 mg tablet

## 2024-05-01 DIAGNOSIS — E03.9 ACQUIRED HYPOTHYROIDISM: ICD-10-CM

## 2024-05-01 RX ORDER — LEVOTHYROXINE SODIUM 88 UG/1
88 TABLET ORAL DAILY
Qty: 90 TABLET | Refills: 3 | Status: SHIPPED | OUTPATIENT
Start: 2024-05-01

## 2024-05-29 ENCOUNTER — TELEPHONE (OUTPATIENT)
Dept: PRIMARY CARE | Facility: CLINIC | Age: 64
End: 2024-05-29
Payer: MEDICARE

## 2024-05-29 DIAGNOSIS — B37.31 YEAST VAGINITIS: Primary | ICD-10-CM

## 2024-05-29 RX ORDER — FLUCONAZOLE 150 MG/1
150 TABLET ORAL ONCE
Qty: 1 TABLET | Refills: 1 | Status: SHIPPED | OUTPATIENT
Start: 2024-05-29 | End: 2024-05-29

## 2024-05-29 NOTE — TELEPHONE ENCOUNTER
Patient would like a refill on Fluconazole 150 mg.  [place]    Patient's number:  [phone]

## 2024-06-12 ENCOUNTER — TELEPHONE (OUTPATIENT)
Dept: PRIMARY CARE | Facility: CLINIC | Age: 64
End: 2024-06-12
Payer: MEDICARE

## 2024-06-12 DIAGNOSIS — E78.49 OTHER HYPERLIPIDEMIA: ICD-10-CM

## 2024-06-12 DIAGNOSIS — E03.9 ACQUIRED HYPOTHYROIDISM: ICD-10-CM

## 2024-07-15 DIAGNOSIS — J01.41 ACUTE RECURRENT PANSINUSITIS: ICD-10-CM

## 2024-07-15 RX ORDER — FLUTICASONE PROPIONATE 50 MCG
2 SPRAY, SUSPENSION (ML) NASAL DAILY
Qty: 16 G | Refills: 1 | Status: SHIPPED | OUTPATIENT
Start: 2024-07-15

## 2024-07-15 NOTE — TELEPHONE ENCOUNTER
Patient is calling in for a refill on Fluticasone 50 mcgs , please send to [facility].  Patient seen last on 03/13/24 sinus.

## 2024-10-30 ENCOUNTER — LAB (OUTPATIENT)
Dept: LAB | Facility: LAB | Age: 64
End: 2024-10-30
Payer: MEDICARE

## 2024-10-30 DIAGNOSIS — E78.49 OTHER HYPERLIPIDEMIA: ICD-10-CM

## 2024-10-30 DIAGNOSIS — E03.9 ACQUIRED HYPOTHYROIDISM: ICD-10-CM

## 2024-10-30 LAB
ALBUMIN SERPL BCP-MCNC: 4 G/DL (ref 3.4–5)
ALP SERPL-CCNC: 82 U/L (ref 33–136)
ALT SERPL W P-5'-P-CCNC: 17 U/L (ref 7–45)
ANION GAP SERPL CALCULATED.3IONS-SCNC: 11 MMOL/L (ref 10–20)
APPEARANCE UR: CLEAR
AST SERPL W P-5'-P-CCNC: 20 U/L (ref 9–39)
BASOPHILS # BLD AUTO: 0.05 X10*3/UL (ref 0–0.1)
BASOPHILS NFR BLD AUTO: 0.8 %
BILIRUB SERPL-MCNC: 0.5 MG/DL (ref 0–1.2)
BILIRUB UR STRIP.AUTO-MCNC: NEGATIVE MG/DL
BUN SERPL-MCNC: 15 MG/DL (ref 6–23)
CALCIUM SERPL-MCNC: 8.8 MG/DL (ref 8.6–10.3)
CHLORIDE SERPL-SCNC: 103 MMOL/L (ref 98–107)
CHOLEST SERPL-MCNC: 244 MG/DL (ref 0–199)
CHOLEST/HDLC SERPL: 3.9 {RATIO}
CO2 SERPL-SCNC: 29 MMOL/L (ref 21–32)
COLOR UR: NORMAL
CREAT SERPL-MCNC: 0.78 MG/DL (ref 0.5–1.05)
EGFRCR SERPLBLD CKD-EPI 2021: 84 ML/MIN/1.73M*2
EOSINOPHIL # BLD AUTO: 0.19 X10*3/UL (ref 0–0.7)
EOSINOPHIL NFR BLD AUTO: 3.2 %
ERYTHROCYTE [DISTWIDTH] IN BLOOD BY AUTOMATED COUNT: 12.9 % (ref 11.5–14.5)
GLUCOSE SERPL-MCNC: 86 MG/DL (ref 74–99)
GLUCOSE UR STRIP.AUTO-MCNC: NORMAL MG/DL
HCT VFR BLD AUTO: 42.2 % (ref 36–46)
HDLC SERPL-MCNC: 62.7 MG/DL
HGB BLD-MCNC: 13.3 G/DL (ref 12–16)
IMM GRANULOCYTES # BLD AUTO: 0.02 X10*3/UL (ref 0–0.7)
IMM GRANULOCYTES NFR BLD AUTO: 0.3 % (ref 0–0.9)
KETONES UR STRIP.AUTO-MCNC: NEGATIVE MG/DL
LDLC SERPL CALC-MCNC: 144 MG/DL
LEUKOCYTE ESTERASE UR QL STRIP.AUTO: NEGATIVE
LYMPHOCYTES # BLD AUTO: 1.9 X10*3/UL (ref 1.2–4.8)
LYMPHOCYTES NFR BLD AUTO: 31.9 %
MCH RBC QN AUTO: 26.6 PG (ref 26–34)
MCHC RBC AUTO-ENTMCNC: 31.5 G/DL (ref 32–36)
MCV RBC AUTO: 84 FL (ref 80–100)
MONOCYTES # BLD AUTO: 0.5 X10*3/UL (ref 0.1–1)
MONOCYTES NFR BLD AUTO: 8.4 %
NEUTROPHILS # BLD AUTO: 3.3 X10*3/UL (ref 1.2–7.7)
NEUTROPHILS NFR BLD AUTO: 55.4 %
NITRITE UR QL STRIP.AUTO: NEGATIVE
NON HDL CHOLESTEROL: 181 MG/DL (ref 0–149)
NRBC BLD-RTO: 0 /100 WBCS (ref 0–0)
PH UR STRIP.AUTO: 5.5 [PH]
PLATELET # BLD AUTO: 249 X10*3/UL (ref 150–450)
POTASSIUM SERPL-SCNC: 4.4 MMOL/L (ref 3.5–5.3)
PROT SERPL-MCNC: 6.9 G/DL (ref 6.4–8.2)
PROT UR STRIP.AUTO-MCNC: NEGATIVE MG/DL
RBC # BLD AUTO: 5 X10*6/UL (ref 4–5.2)
RBC # UR STRIP.AUTO: NEGATIVE /UL
SODIUM SERPL-SCNC: 139 MMOL/L (ref 136–145)
SP GR UR STRIP.AUTO: 1.01
TRIGL SERPL-MCNC: 185 MG/DL (ref 0–149)
TSH SERPL-ACNC: 2.6 MIU/L (ref 0.44–3.98)
UROBILINOGEN UR STRIP.AUTO-MCNC: NORMAL MG/DL
VLDL: 37 MG/DL (ref 0–40)
WBC # BLD AUTO: 6 X10*3/UL (ref 4.4–11.3)

## 2024-10-30 PROCEDURE — 81003 URINALYSIS AUTO W/O SCOPE: CPT

## 2024-10-30 PROCEDURE — 85025 COMPLETE CBC W/AUTO DIFF WBC: CPT

## 2024-10-30 PROCEDURE — 80053 COMPREHEN METABOLIC PANEL: CPT

## 2024-10-30 PROCEDURE — 80061 LIPID PANEL: CPT

## 2024-10-30 PROCEDURE — 36415 COLL VENOUS BLD VENIPUNCTURE: CPT

## 2024-10-30 PROCEDURE — 84443 ASSAY THYROID STIM HORMONE: CPT

## 2024-11-05 ENCOUNTER — TELEPHONE (OUTPATIENT)
Dept: PRIMARY CARE | Facility: CLINIC | Age: 64
End: 2024-11-05
Payer: MEDICARE

## 2024-11-05 DIAGNOSIS — B37.31 YEAST VAGINITIS: ICD-10-CM

## 2024-11-05 RX ORDER — FLUCONAZOLE 150 MG/1
TABLET ORAL
Qty: 1 TABLET | Refills: 1 | Status: SHIPPED | OUTPATIENT
Start: 2024-11-05

## 2024-11-05 ASSESSMENT — PROMIS GLOBAL HEALTH SCALE
RATE_SOCIAL_SATISFACTION: EXCELLENT
EMOTIONAL_PROBLEMS: NEVER
RATE_QUALITY_OF_LIFE: VERY GOOD
RATE_MENTAL_HEALTH: EXCELLENT
RATE_AVERAGE_FATIGUE: MILD
CARRYOUT_SOCIAL_ACTIVITIES: EXCELLENT
CARRYOUT_PHYSICAL_ACTIVITIES: COMPLETELY
RATE_PHYSICAL_HEALTH: FAIR
RATE_GENERAL_HEALTH: FAIR
RATE_AVERAGE_PAIN: 6

## 2024-11-05 NOTE — TELEPHONE ENCOUNTER
Patient states she is currently on Clindamycin for a root canal and has yeast symptoms.  She is requesting Diflucan 2 doses if possible.

## 2024-11-05 NOTE — TELEPHONE ENCOUNTER
Pt called  [phone] she had dental work done had ABX, she now has a yeast infection from it would like a RX called in, declined appointment

## 2024-11-05 NOTE — TELEPHONE ENCOUNTER
Patient called on RX line to request a prescription for Fluconazole 150 mg be forwarded to [facility] [place]  She states that she is on antibiotics from her dental appointment and she now has a yeast infection.  She is asking for two tablets.  Please advise.     Patient ph# [phone]

## 2024-11-12 ENCOUNTER — TELEPHONE (OUTPATIENT)
Dept: PRIMARY CARE | Facility: CLINIC | Age: 64
End: 2024-11-12

## 2024-11-12 ENCOUNTER — APPOINTMENT (OUTPATIENT)
Dept: PRIMARY CARE | Facility: CLINIC | Age: 64
End: 2024-11-12
Payer: MEDICARE

## 2024-11-12 VITALS
HEART RATE: 82 BPM | OXYGEN SATURATION: 99 % | SYSTOLIC BLOOD PRESSURE: 130 MMHG | BODY MASS INDEX: 37.29 KG/M2 | HEIGHT: 67 IN | DIASTOLIC BLOOD PRESSURE: 76 MMHG | TEMPERATURE: 97.3 F

## 2024-11-12 DIAGNOSIS — E03.9 ACQUIRED HYPOTHYROIDISM: Primary | ICD-10-CM

## 2024-11-12 DIAGNOSIS — J30.2 SEASONAL ALLERGIC RHINITIS, UNSPECIFIED TRIGGER: ICD-10-CM

## 2024-11-12 DIAGNOSIS — E78.49 OTHER HYPERLIPIDEMIA: ICD-10-CM

## 2024-11-12 DIAGNOSIS — E03.9 ACQUIRED HYPOTHYROIDISM: ICD-10-CM

## 2024-11-12 DIAGNOSIS — L30.9 ECZEMA, UNSPECIFIED TYPE: ICD-10-CM

## 2024-11-12 DIAGNOSIS — Z00.00 WELL ADULT EXAM: ICD-10-CM

## 2024-11-12 DIAGNOSIS — D44.7 GLOMUS JUGULARE TUMOR (MULTI): ICD-10-CM

## 2024-11-12 PROCEDURE — G0438 PPPS, INITIAL VISIT: HCPCS | Performed by: FAMILY MEDICINE

## 2024-11-12 PROCEDURE — 1170F FXNL STATUS ASSESSED: CPT | Performed by: FAMILY MEDICINE

## 2024-11-12 PROCEDURE — 1124F ACP DISCUSS-NO DSCNMKR DOCD: CPT | Performed by: FAMILY MEDICINE

## 2024-11-12 PROCEDURE — 1125F AMNT PAIN NOTED PAIN PRSNT: CPT | Performed by: FAMILY MEDICINE

## 2024-11-12 PROCEDURE — 1036F TOBACCO NON-USER: CPT | Performed by: FAMILY MEDICINE

## 2024-11-12 PROCEDURE — 1159F MED LIST DOCD IN RCRD: CPT | Performed by: FAMILY MEDICINE

## 2024-11-12 RX ORDER — CLOBETASOL PROPIONATE 0.5 MG/G
1 CREAM TOPICAL 2 TIMES DAILY
Qty: 30 G | Refills: 1 | Status: SHIPPED | OUTPATIENT
Start: 2024-11-12

## 2024-11-12 ASSESSMENT — PATIENT HEALTH QUESTIONNAIRE - PHQ9
2. FEELING DOWN, DEPRESSED OR HOPELESS: NOT AT ALL
SUM OF ALL RESPONSES TO PHQ9 QUESTIONS 1 AND 2: 0
1. LITTLE INTEREST OR PLEASURE IN DOING THINGS: NOT AT ALL

## 2024-11-12 ASSESSMENT — ENCOUNTER SYMPTOMS
OCCASIONAL FEELINGS OF UNSTEADINESS: 0
DEPRESSION: 0
LOSS OF SENSATION IN FEET: 0

## 2024-11-12 ASSESSMENT — ACTIVITIES OF DAILY LIVING (ADL)
DRESSING: INDEPENDENT
DOING_HOUSEWORK: INDEPENDENT
TAKING_MEDICATION: INDEPENDENT
GROCERY_SHOPPING: INDEPENDENT
BATHING: INDEPENDENT
MANAGING_FINANCES: INDEPENDENT

## 2024-11-12 ASSESSMENT — PAIN SCALES - GENERAL: PAINLEVEL_OUTOF10: 5

## 2024-11-12 NOTE — PROGRESS NOTES
Subjective   Patient ID: Bhavana Jean is a 64 y.o. female who presents for Medicare Annual Wellness Visit Subsequent (EKG done 11/2023/Colonoscopy- declined/Mammogram 9/2023  gets thru GYN  Dr. Still/Bone Density-  gets thru GYN Dr. Still/Pneumonia vaccine-   declined-  reactions to various vaccine/Flu vaccine-  declined/).    HPI   Here for a comprehensive physical exam. PMH, PSH, family history and social history were reviewed and updated.   She is under the care of gynecology and is UTD with cervical and breast cancer screening.   She was told to not get anymore screening colonoscopies due to pancreatitis flare-up. She is checked frequently by her heme/onc physicians for colon cancer via blood work. She had an EKG 2 years ago, and it was normal. She had it completed at  prior to radiation. She had fasting labs completed recently.   Pt has Hypothyroidism: TSH normal, compliant with medication, asymptomatic, needs refill. On levothyroxine 88 micrograms daily.  Pt has Hyperlipidemia: no medication, lipid panel slightly increased this year, admits to not exercising at this time, follows a low fat diet. Has been tried on statin but it flared up her pancreatitis and she had elevated liver enzymes.  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 She is status post multiple operations with reconstructions.   Patent has seasonal allergies and uses steroid nasal spray routinely.   She has been immunized against coronavirus x 2.  She does not receive influenza immunizations.  She has not received pneumococcal Immunizations.  She is not received shingles immunizations.  Patient does not use tobacco.  Patient does not use alcohol.  Review of Systems  GENERAL: denies lack of energy, unexplained weight gain or weight loss, loss of appetite, fever, night sweats.  HEENT: denies difficulty with hearing, sinus problems, runny nose, post-nasal drip, ringing in ears, mouth sores, loose teeth, ear  "pain, nosebleeds, sore throat, facial pain or numbness.  CV: denies irregular heartbeat, racing heart, chest pains, swelling of feet or legs, pain in legs with walking.  RESPIRATORY: denies shortness of breath, prolonged cough, wheezing, sputum production, prior tuberculosis, pleurisy, oxygen at home, coughing up blood, abnormal chest x-ray.  GI: denies heartburn, constipation, intolerance to certain foods, diarrhea, abdominal pain, nausea, vomiting, blood in stools, unexplained change in bowel habits, incontinence, swallowing difficulty.  : denies painful urination, frequent urination, urgency, bladder problems.  MS: denies joint pain, aching muscles, swelling of joints, joint deformities, back pain.  INTEGUMENT: denies persistent rash, itching, new skin lesion, change in existing skin lesion, hair loss or increase, breast changes.  NEUROLOGIC: denies frequent headaches, double vision, weakness, change in sensation, problems with walking or balance, dizziness, tremor, loss of consciousness, uncontrolled motions, episodes of visual loss.  PSYCHIATRIC: denies insomnia, irritability, depression, anxiety, recurrent bad thoughts.  ENDOCRINOLOGIC: denies intolerance to heat or cold, menstrual irregularities, frequent hunger/urination/thirst.  HEMATOLOGIC: denies easy bleeding, easy bruising, anemia, leukemia, unexplained swollen areas.  ALLERGIC/IMMUNOLOGIC: denies seasonal allergies, hay fever symptoms, itching, frequent infections.   Objective   /76 (BP Location: Left arm, Patient Position: Sitting, BP Cuff Size: Large adult)   Pulse 82   Temp 36.3 °C (97.3 °F) (Temporal)   Ht 1.702 m (5' 7\")   LMP  (LMP Unknown)   SpO2 99%   BMI 37.29 kg/m²     Physical Exam  General appearance: Well developed, obese, in no acute distress.  Skin: Inspection of the skin reveals no rashes, ulceration, or petechiae.  HEENT: The sclerae were anicteric and conjunctivae were pink and moist. Extraocular movements were intact " and pupils were equal, round, and reactive to light with normal accommodation. External inspection of the ears and nose showed no scars, lesions, or masses. EACs clear, TMs translucent, ossicles normal appearance, hearing intact. Nasal mucosa non-inflamed, turbinates normal. Lips, teeth, and gums showed normal mucosa. The oral mucosa, hard and soft palate, tongue and posterior pharynx were normal.  Neck: Supple and symmetric. There was no thyroid enlargement, and no tenderness, or masses were felt.   Lungs: Auscultation of the lungs revealed normal breath sounds without any other adventitious sounds.  Cardiovascular: There was a regular rate and rhythm without any murmurs, gallops, or rubs. There were no carotid bruits. Peripheral pulses were 2+ and symmetric. Brisk capillary refill.  Abdomen: Soft and nontender with normal bowel sounds. The liver was not enlarged or tender. The spleen was not palpable. There was no umbilical hernia noted. No ascites was noted.  Lymph nodes: No lymphadenopathy was appreciated in the neck.  Musculoskeletal: There was no tenderness or effusions noted. Muscle strength and tone were normal.   Extremities: No cyanosis, clubbing, or edema.  Neurologic: Alert and oriented x 3. Normal affect. Normal deep tendon reflexes with no pathological reflexes. Sensation to touch was normal.   Rectal: Deferred.  Breasts: Deferred.  Gynecological: Deferred.   Assessment/Plan   Problem List Items Addressed This Visit             ICD-10-CM    Hypothyroidism - Primary   stable.  Continue on levothyroxine 88 mcg daily.  TSH is normal at this time. E03.9    Hyperlipidemia continue to monitor.  Patient has an LDL in the 140s however she has an HDL in the 60s. E78.5    Glomus jugulare tumor (Multi)   in remission.  Patient sees ENT at least twice a year. D44.7     Other Visit Diagnoses         Codes    Well adult exam     normal exam. Z00.00    Seasonal allergic rhinitis, unspecified trigger    chronic,  intermittent.  Patient will use OTC nasal steroid spray as needed. J30.2    Eczema, unspecified type    chronic, intermittent.  Refill clobetasol cream for as needed use. L30.9    Relevant Medications    clobetasol (Temovate) 0.05 % cream

## 2025-01-20 ENCOUNTER — APPOINTMENT (OUTPATIENT)
Dept: RADIOLOGY | Facility: HOSPITAL | Age: 65
End: 2025-01-20
Payer: MEDICARE

## 2025-01-20 ENCOUNTER — OFFICE VISIT (OUTPATIENT)
Dept: URGENT CARE | Age: 65
End: 2025-01-20
Payer: MEDICARE

## 2025-01-20 ENCOUNTER — HOSPITAL ENCOUNTER (EMERGENCY)
Facility: HOSPITAL | Age: 65
Discharge: HOME | End: 2025-01-20
Payer: MEDICARE

## 2025-01-20 VITALS
HEART RATE: 72 BPM | SYSTOLIC BLOOD PRESSURE: 167 MMHG | RESPIRATION RATE: 16 BRPM | WEIGHT: 230 LBS | DIASTOLIC BLOOD PRESSURE: 68 MMHG | HEIGHT: 68 IN | TEMPERATURE: 97.6 F | BODY MASS INDEX: 34.86 KG/M2 | OXYGEN SATURATION: 97 %

## 2025-01-20 VITALS
HEART RATE: 85 BPM | SYSTOLIC BLOOD PRESSURE: 153 MMHG | WEIGHT: 229.28 LBS | RESPIRATION RATE: 16 BRPM | OXYGEN SATURATION: 95 % | DIASTOLIC BLOOD PRESSURE: 85 MMHG | HEIGHT: 68 IN | BODY MASS INDEX: 34.75 KG/M2 | TEMPERATURE: 97.7 F

## 2025-01-20 DIAGNOSIS — R11.2 NAUSEA AND VOMITING, UNSPECIFIED VOMITING TYPE: Primary | ICD-10-CM

## 2025-01-20 DIAGNOSIS — U07.1 COVID-19: ICD-10-CM

## 2025-01-20 DIAGNOSIS — U09.9 COVID-19 LONG HAULER MANIFESTING CHRONIC FATIGUE: ICD-10-CM

## 2025-01-20 DIAGNOSIS — G93.32 COVID-19 LONG HAULER MANIFESTING CHRONIC FATIGUE: ICD-10-CM

## 2025-01-20 DIAGNOSIS — R07.9 CHEST PAIN, UNSPECIFIED TYPE: ICD-10-CM

## 2025-01-20 DIAGNOSIS — R68.89 FLU-LIKE SYMPTOMS: ICD-10-CM

## 2025-01-20 LAB
ALBUMIN SERPL BCP-MCNC: 4.1 G/DL (ref 3.4–5)
ALP SERPL-CCNC: 81 U/L (ref 33–136)
ALT SERPL W P-5'-P-CCNC: 29 U/L (ref 7–45)
ANION GAP SERPL CALCULATED.3IONS-SCNC: 10 MMOL/L (ref 10–20)
APPEARANCE UR: CLEAR
AST SERPL W P-5'-P-CCNC: 30 U/L (ref 9–39)
BASOPHILS # BLD AUTO: 0.02 X10*3/UL (ref 0–0.1)
BASOPHILS NFR BLD AUTO: 0.4 %
BILIRUB SERPL-MCNC: 0.4 MG/DL (ref 0–1.2)
BILIRUB UR STRIP.AUTO-MCNC: NEGATIVE MG/DL
BUN SERPL-MCNC: 10 MG/DL (ref 6–23)
CALCIUM SERPL-MCNC: 8.2 MG/DL (ref 8.6–10.3)
CARDIAC TROPONIN I PNL SERPL HS: 5 NG/L (ref 0–13)
CARDIAC TROPONIN I PNL SERPL HS: 5 NG/L (ref 0–13)
CHLORIDE SERPL-SCNC: 101 MMOL/L (ref 98–107)
CO2 SERPL-SCNC: 26 MMOL/L (ref 21–32)
COLOR UR: ABNORMAL
CREAT SERPL-MCNC: 0.65 MG/DL (ref 0.5–1.05)
EGFRCR SERPLBLD CKD-EPI 2021: >90 ML/MIN/1.73M*2
EOSINOPHIL # BLD AUTO: 0 X10*3/UL (ref 0–0.7)
EOSINOPHIL NFR BLD AUTO: 0 %
ERYTHROCYTE [DISTWIDTH] IN BLOOD BY AUTOMATED COUNT: 12.7 % (ref 11.5–14.5)
GLUCOSE SERPL-MCNC: 125 MG/DL (ref 74–99)
GLUCOSE UR STRIP.AUTO-MCNC: NORMAL MG/DL
HCT VFR BLD AUTO: 40.6 % (ref 36–46)
HGB BLD-MCNC: 13 G/DL (ref 12–16)
IMM GRANULOCYTES # BLD AUTO: 0.02 X10*3/UL (ref 0–0.7)
IMM GRANULOCYTES NFR BLD AUTO: 0.4 % (ref 0–0.9)
KETONES UR STRIP.AUTO-MCNC: NEGATIVE MG/DL
LEUKOCYTE ESTERASE UR QL STRIP.AUTO: ABNORMAL
LIPASE SERPL-CCNC: 19 U/L (ref 9–82)
LYMPHOCYTES # BLD AUTO: 0.87 X10*3/UL (ref 1.2–4.8)
LYMPHOCYTES NFR BLD AUTO: 15.9 %
MAGNESIUM SERPL-MCNC: 2.1 MG/DL (ref 1.6–2.4)
MCH RBC QN AUTO: 26.4 PG (ref 26–34)
MCHC RBC AUTO-ENTMCNC: 32 G/DL (ref 32–36)
MCV RBC AUTO: 83 FL (ref 80–100)
MONOCYTES # BLD AUTO: 0.31 X10*3/UL (ref 0.1–1)
MONOCYTES NFR BLD AUTO: 5.7 %
MUCOUS THREADS #/AREA URNS AUTO: NORMAL /LPF
NEUTROPHILS # BLD AUTO: 4.24 X10*3/UL (ref 1.2–7.7)
NEUTROPHILS NFR BLD AUTO: 77.6 %
NITRITE UR QL STRIP.AUTO: NEGATIVE
NRBC BLD-RTO: 0 /100 WBCS (ref 0–0)
PH UR STRIP.AUTO: 6 [PH]
PLATELET # BLD AUTO: 218 X10*3/UL (ref 150–450)
POC RAPID INFLUENZA A: NEGATIVE
POC RAPID INFLUENZA B: NEGATIVE
POC SARS-COV-2 AG BINAX: ABNORMAL
POTASSIUM SERPL-SCNC: 3.8 MMOL/L (ref 3.5–5.3)
PROT SERPL-MCNC: 7 G/DL (ref 6.4–8.2)
PROT UR STRIP.AUTO-MCNC: ABNORMAL MG/DL
RBC # BLD AUTO: 4.92 X10*6/UL (ref 4–5.2)
RBC # UR STRIP.AUTO: NEGATIVE /UL
RBC #/AREA URNS AUTO: NORMAL /HPF
SODIUM SERPL-SCNC: 133 MMOL/L (ref 136–145)
SP GR UR STRIP.AUTO: 1.02
UROBILINOGEN UR STRIP.AUTO-MCNC: NORMAL MG/DL
WBC # BLD AUTO: 5.5 X10*3/UL (ref 4.4–11.3)
WBC #/AREA URNS AUTO: NORMAL /HPF

## 2025-01-20 PROCEDURE — 84484 ASSAY OF TROPONIN QUANT: CPT | Performed by: PHYSICIAN ASSISTANT

## 2025-01-20 PROCEDURE — 81001 URINALYSIS AUTO W/SCOPE: CPT | Performed by: PHYSICIAN ASSISTANT

## 2025-01-20 PROCEDURE — 71045 X-RAY EXAM CHEST 1 VIEW: CPT | Performed by: STUDENT IN AN ORGANIZED HEALTH CARE EDUCATION/TRAINING PROGRAM

## 2025-01-20 PROCEDURE — 71045 X-RAY EXAM CHEST 1 VIEW: CPT

## 2025-01-20 PROCEDURE — 36415 COLL VENOUS BLD VENIPUNCTURE: CPT | Performed by: PHYSICIAN ASSISTANT

## 2025-01-20 PROCEDURE — 99284 EMERGENCY DEPT VISIT MOD MDM: CPT | Mod: 25

## 2025-01-20 PROCEDURE — 87086 URINE CULTURE/COLONY COUNT: CPT | Mod: TRILAB | Performed by: PHYSICIAN ASSISTANT

## 2025-01-20 PROCEDURE — 80053 COMPREHEN METABOLIC PANEL: CPT | Performed by: PHYSICIAN ASSISTANT

## 2025-01-20 PROCEDURE — 83690 ASSAY OF LIPASE: CPT | Performed by: PHYSICIAN ASSISTANT

## 2025-01-20 PROCEDURE — 83735 ASSAY OF MAGNESIUM: CPT | Performed by: PHYSICIAN ASSISTANT

## 2025-01-20 PROCEDURE — 85025 COMPLETE CBC W/AUTO DIFF WBC: CPT | Performed by: PHYSICIAN ASSISTANT

## 2025-01-20 PROCEDURE — 2500000004 HC RX 250 GENERAL PHARMACY W/ HCPCS (ALT 636 FOR OP/ED): Performed by: PHYSICIAN ASSISTANT

## 2025-01-20 PROCEDURE — 96374 THER/PROPH/DIAG INJ IV PUSH: CPT

## 2025-01-20 RX ORDER — ONDANSETRON HYDROCHLORIDE 2 MG/ML
4 INJECTION, SOLUTION INTRAVENOUS ONCE
Status: COMPLETED | OUTPATIENT
Start: 2025-01-20 | End: 2025-01-20

## 2025-01-20 RX ORDER — PREDNISONE 50 MG/1
50 TABLET ORAL DAILY
Qty: 5 TABLET | Refills: 0 | Status: SHIPPED | OUTPATIENT
Start: 2025-01-20 | End: 2025-01-25

## 2025-01-20 RX ORDER — ONDANSETRON 4 MG/1
4 TABLET, ORALLY DISINTEGRATING ORAL EVERY 8 HOURS PRN
Qty: 20 TABLET | Refills: 0 | Status: SHIPPED | OUTPATIENT
Start: 2025-01-20 | End: 2025-01-27

## 2025-01-20 RX ADMIN — ONDANSETRON 4 MG: 2 INJECTION INTRAMUSCULAR; INTRAVENOUS at 22:36

## 2025-01-20 ASSESSMENT — PAIN DESCRIPTION - ONSET: ONSET: SUDDEN

## 2025-01-20 ASSESSMENT — HEART SCORE
HISTORY: SLIGHTLY SUSPICIOUS
RISK FACTORS: NO KNOWN RISK FACTORS
AGE: 65+
TROPONIN: LESS THAN OR EQUAL TO NORMAL LIMIT
ECG: NORMAL
HEART SCORE: 2

## 2025-01-20 ASSESSMENT — PAIN SCALES - GENERAL
PAINLEVEL_OUTOF10: 8
PAINLEVEL_OUTOF10: 7

## 2025-01-20 ASSESSMENT — COLUMBIA-SUICIDE SEVERITY RATING SCALE - C-SSRS
2. HAVE YOU ACTUALLY HAD ANY THOUGHTS OF KILLING YOURSELF?: NO
6. HAVE YOU EVER DONE ANYTHING, STARTED TO DO ANYTHING, OR PREPARED TO DO ANYTHING TO END YOUR LIFE?: NO
1. IN THE PAST MONTH, HAVE YOU WISHED YOU WERE DEAD OR WISHED YOU COULD GO TO SLEEP AND NOT WAKE UP?: NO

## 2025-01-20 ASSESSMENT — PAIN DESCRIPTION - LOCATION
LOCATION_2: CHEST
LOCATION: HEAD

## 2025-01-20 ASSESSMENT — PAIN DESCRIPTION - ORIENTATION: ORIENTATION: LEFT

## 2025-01-20 ASSESSMENT — PAIN - FUNCTIONAL ASSESSMENT: PAIN_FUNCTIONAL_ASSESSMENT: 0-10

## 2025-01-20 ASSESSMENT — PAIN DESCRIPTION - FREQUENCY: FREQUENCY: CONSTANT/CONTINUOUS

## 2025-01-20 ASSESSMENT — PAIN DESCRIPTION - DESCRIPTORS: DESCRIPTORS: SHARP

## 2025-01-20 ASSESSMENT — PAIN DESCRIPTION - PAIN TYPE: TYPE: ACUTE PAIN

## 2025-01-20 ASSESSMENT — PAIN DESCRIPTION - PROGRESSION: CLINICAL_PROGRESSION: NOT CHANGED

## 2025-01-20 NOTE — PROGRESS NOTES
Chief Complaint   Patient presents with    Sinus Problem     Sinus congestion, 1.5 month    Cough     1 month    Chills     1 week       Physical Exam:     GEN: No acute distress    ENT: Bilateral TMs and canals unremarkable, sinus congestion present. Pharynx and tonsils mildly hyperemic but without exudate.     Resp: Lungs clear to auscultation bilaterally       POC Labs:     Office Visit on 01/20/2025   Component Date Value Ref Range Status    POC Rapid Influenza A 01/20/2025 Negative  Negative Final    POC Rapid Influenza B 01/20/2025 Negative  Negative Final    POC ANGELA-COV-2 AG 01/20/2025 Positive test for SARS-CoV-2 (antigen detected) (A)  Presumptive negative test for SARS-CoV-2 (no antigen detected) Final        Encounter Diagnoses   Name Primary?    COVID-19 long hauler manifesting chronic fatigue     Flu-like symptoms         Medical Decision Making & Plan:     Paxlovid  Prednisone    01/20/25 at 9:18 AM - Anneliese Michelle, DO

## 2025-01-21 LAB — HOLD SPECIMEN: NORMAL

## 2025-01-21 NOTE — DISCHARGE INSTRUCTIONS
Thank you for choosing [facility] for your healthcare needs today!  There were no acute findings found in your workup today warranting hospital admission or inpatient management.  Zofran has been sent to your retail pharmacy of choice for relief of nausea outpatient.  Please take this medication as prescribed and when directed.  Follow-up with your primary care provider as recommended after today's visit.  Please call and schedule an appointment with them as soon as you are able to.  Return to the emergency department should symptoms change or worsen.

## 2025-01-21 NOTE — ED PROVIDER NOTES
HPI   Chief Complaint   Patient presents with    Vomiting    Flu Symptoms     Pt has been vomiting all day. Pt went to urgent care this morning and was diagnosed with covid. Pt has headache, nausea, vomiting, diarrhea, cough, shortness of breath, and some chest pain.       HPI    Patient is a 64-year-old female with a history of hypothyroidism presenting for evaluation of nausea, vomiting and chest pain.  Patient states that she has been having upper respiratory symptoms of cough and sinus congestion for upwards of 1.5 months.  She states that she was seen at an urgent care this morning where she was diagnosed with COVID-19 infection and was prescribed Paxlovid and prednisone.  She states that she took these medications and as the day has gone on she has felt worse.  She states that at 8 PM this evening she developed nausea with vomiting and some diarrhea.  She states she also developed some midsternal chest pressure.  She denies radiation of the pain to her back or extremities.  She denies abdominal pain.  No dysuria or hematuria.  She states that she does have some shortness of breath but this has been going on for upwards of 6 weeks.  She denies history of heart failure.  No leg swelling or weight gain.  Patient denies any cardiac history.    Patient History   Past Medical History:   Diagnosis Date    Acquired absence of both cervix and uterus     H/O: hysterectomy    Personal history of other endocrine, nutritional and metabolic disease     History of thyroid disease    Personal history of other specified conditions     History of blood loss     Past Surgical History:   Procedure Laterality Date    ADENOIDECTOMY  08/25/2014    Adenoidectomy    GALLBLADDER SURGERY  08/25/2014    Gallbladder Surgery    HYSTERECTOMY  08/25/2014    Hysterectomy    OTHER SURGICAL HISTORY  08/25/2014    Leg Repair    OTHER SURGICAL HISTORY  08/25/2014    Ear Surgery    TONSILLECTOMY  08/25/2014    Tonsillectomy     Family History    Problem Relation Name Age of Onset    Colon cancer Mother       Social History     Tobacco Use    Smoking status: Never    Smokeless tobacco: Never   Vaping Use    Vaping status: Never Used   Substance Use Topics    Alcohol use: Never    Drug use: Never       Physical Exam   ED Triage Vitals [01/20/25 2137]   Temperature Heart Rate Respirations BP   36.5 °C (97.7 °F) 85 16 153/85      Pulse Ox Temp Source Heart Rate Source Patient Position   95 % Temporal Monitor --      BP Location FiO2 (%)     -- --       Physical Exam  Vitals and nursing note reviewed.   Constitutional:       Appearance: Normal appearance.   HENT:      Head: Normocephalic and atraumatic.   Eyes:      Extraocular Movements: Extraocular movements intact.      Pupils: Pupils are equal, round, and reactive to light.   Cardiovascular:      Rate and Rhythm: Normal rate and regular rhythm.      Pulses: Normal pulses.      Heart sounds: Normal heart sounds.   Pulmonary:      Effort: Pulmonary effort is normal.      Breath sounds: Normal breath sounds.   Abdominal:      General: Abdomen is flat. Bowel sounds are normal.      Palpations: Abdomen is soft.   Neurological:      Mental Status: She is alert.           ED Course & MDM   ED Course as of 01/20/25 2320 Mon Jan 20, 2025 2157 EKG interpreted by myself independently, EKG shows a normal sinus rhythm, rate 79 bpm, NM interval 152, QRS 82, , QTc 504, patient has no ST elevation or depression, negative for acute MI. [EL]      ED Course User Index  [EL] Kemal Little DO         Diagnoses as of 01/20/25 2320   Nausea and vomiting, unspecified vomiting type   Chest pain, unspecified type   COVID-19                 No data recorded       HEART Score: 2 (01/20/25 2316 : Meredith Marques PA-C)                         Medical Decision Making    Parts of this chart have been completed using voice recognition software. Please excuse any errors of transcription. Despite the medical decision making time  stamp above-my medical decision making has taken place during the patient's entire visit. My thought process and reason for plan has been formulated from the time that I saw the patient until the time of disposition and is not specific to one specific moment during their visit and furthermore my MDM encompasses this entire chart and not only this text box.      HPI: Detailed above.    Exam: A medically appropriate exam performed, outlined above, given the known history and presentation.    History obtained from: Patient    Social Determinants of Health considered during this visit: From home    EKG interpreted by my attending physician, reviewed by myself.    Labs Reviewed   CBC WITH AUTO DIFFERENTIAL - Abnormal       Result Value    WBC 5.5      nRBC 0.0      RBC 4.92      Hemoglobin 13.0      Hematocrit 40.6      MCV 83      MCH 26.4      MCHC 32.0      RDW 12.7      Platelets 218      Neutrophils % 77.6      Immature Granulocytes %, Automated 0.4      Lymphocytes % 15.9      Monocytes % 5.7      Eosinophils % 0.0      Basophils % 0.4      Neutrophils Absolute 4.24      Immature Granulocytes Absolute, Automated 0.02      Lymphocytes Absolute 0.87 (*)     Monocytes Absolute 0.31      Eosinophils Absolute 0.00      Basophils Absolute 0.02     COMPREHENSIVE METABOLIC PANEL - Abnormal    Glucose 125 (*)     Sodium 133 (*)     Potassium 3.8      Chloride 101      Bicarbonate 26      Anion Gap 10      Urea Nitrogen 10      Creatinine 0.65      eGFR >90      Calcium 8.2 (*)     Albumin 4.1      Alkaline Phosphatase 81      Total Protein 7.0      AST 30      Bilirubin, Total 0.4      ALT 29     URINALYSIS WITH REFLEX CULTURE AND MICROSCOPIC - Abnormal    Color, Urine Light-Yellow      Appearance, Urine Clear      Specific Gravity, Urine 1.019      pH, Urine 6.0      Protein, Urine 10 (TRACE)      Glucose, Urine Normal      Blood, Urine NEGATIVE      Ketones, Urine NEGATIVE      Bilirubin, Urine NEGATIVE      Urobilinogen,  Urine Normal      Nitrite, Urine NEGATIVE      Leukocyte Esterase, Urine 25 Bárbara/uL (*)    MAGNESIUM - Normal    Magnesium 2.10     LIPASE - Normal    Lipase 19      Narrative:     Venipuncture immediately after or during the administration of Metamizole may lead to falsely low results. Testing should be performed immediately prior to Metamizole dosing.   SERIAL TROPONIN-INITIAL - Normal    Troponin I, High Sensitivity 5      Narrative:     Less than 99th percentile of normal range cutoff-  Female and children under 18 years old <14 ng/L; Male <21 ng/L: Negative  Repeat testing should be performed if clinically indicated.     Female and children under 18 years old 14-50 ng/L; Male 21-50 ng/L:  Consistent with possible cardiac damage and possible increased clinical   risk. Serial measurements may help to assess extent of myocardial damage.     >50 ng/L: Consistent with cardiac damage, increased clinical risk and  myocardial infarction. Serial measurements may help assess extent of   myocardial damage.      NOTE: Children less than 1 year old may have higher baseline troponin   levels and results should be interpreted in conjunction with the overall   clinical context.     NOTE: Troponin I testing is performed using a different   testing methodology at [facility] than at [facility]. Direct result comparisons should only   be made within the same method.   SERIAL TROPONIN, 1 HOUR - Normal    Troponin I, High Sensitivity 5      Narrative:     Less than 99th percentile of normal range cutoff-  Female and children under 18 years old <14 ng/L; Male <21 ng/L: Negative  Repeat testing should be performed if clinically indicated.     Female and children under 18 years old 14-50 ng/L; Male 21-50 ng/L:  Consistent with possible cardiac damage and possible increased clinical   risk. Serial measurements may help to assess extent of myocardial damage.     >50 ng/L: Consistent with cardiac damage,  increased clinical risk and  myocardial infarction. Serial measurements may help assess extent of   myocardial damage.      NOTE: Children less than 1 year old may have higher baseline troponin   levels and results should be interpreted in conjunction with the overall   clinical context.     NOTE: Troponin I testing is performed using a different   testing methodology at [facility] than at other   [facility]. Direct result comparisons should only   be made within the same method.   URINE CULTURE   TROPONIN SERIES- (INITIAL, 1 HR)    Narrative:     The following orders were created for panel order Troponin I Series, High Sensitivity (0, 1 HR).  Procedure                               Abnormality         Status                     ---------                               -----------         ------                     Troponin I, High Sensiti...[[id number]]  Normal              Final result               Troponin, High Sensitivi...[[id number]]  Normal              Final result                 Please view results for these tests on the individual orders.   MICROSCOPIC ONLY, URINE    WBC, Urine 1-5      RBC, Urine 1-2      Mucus, Urine FEW     URINALYSIS WITH REFLEX CULTURE AND MICROSCOPIC    Narrative:     The following orders were created for panel order Urinalysis with Reflex Culture and Microscopic.  Procedure                               Abnormality         Status                     ---------                               -----------         ------                     Urinalysis with Reflex C...[[id number]]  Abnormal            Final result               Extra Urine Gray Tube[[id number]]                            In process                   Please view results for these tests on the individual orders.   EXTRA URINE GRAY TUBE     XR chest 1 view   Final Result   1.  No evidence of acute cardiopulmonary process.                  MACRO:   None        Signed by: Ramsey German 1/20/2025 10:46 PM    Dictation workstation:   [id number]        Medications   ondansetron (Zofran) injection 4 mg (4 mg intravenous Given 1/20/25 2236)     Differential diagnoses considered for this visit include: Viral gastroenteritis versus COVID-19 infection versus electrolyte imbalance versus metabolic disturbance versus ACS versus STEMI versus NSTEMI versus pneumonia    Considerations/further MDM:    Patient is a 64-year-old female with a recent COVID-19 diagnosis presenting for evaluation of nausea, vomiting, diarrhea and chest pain.  Vital signs are hemodynamically stable.  Physical exam demonstrated normal heart and lung sounds with pulses equal bilaterally.  There was no abdominal tenderness on exam.  Cardiac workup pursued given patient's complaint of chest pain.  Zofran administered for relief of nausea.  On chart review, it does appear that the patient went to an urgent care today where her COVID-19 test was positive.  She was prescribed Paxil it and prednisone as stated.    EKG showed a normal sinus rhythm with a rate of 79 bpm without ST elevation or depression and negative for acute MI according to my attending physician.  CBC shows no leukocytosis or anemia.  CMP demonstrates balanced electrolytes and normal liver and kidney function.  Urinalysis was negative for infection.  Exam was within normal limits.  Lipase was unremarkable.  Chest x-ray showed no evidence of acute cardiopulmonary process. Initial troponin of 5, repeat of 5.  Patient had a heart score of 2.  On reevaluation, patient was resting comfortably in the bed.  She had no episodes of emesis throughout her course in the emergency department.  Discharge disposition was found to be appropriate.  Patient will be sent home with a prescription for Zofran for relief of her nausea outpatient.  She was instructed to return to the emergency department should symptoms change or worsen.  Patient was comfortable with this plan of care.  She was discharged home in  good condition.    All of the patient's questions were answered to the best of my ability. Patient states understanding that they have been screened for an emergency today, and we have not found any etiology of symptoms that requires emergent treatment or admission to the hospital at this point. They understand that they have not had definitive care day and require follow-up for treatment of their condition. They also state understanding that they may have an emergent condition that may potentially have not of detected at this visit and they must return to the emergency department if they develop any worsening of symptoms or new complaints.    Attending physician Dr. Shirley Richmond was available for consultation.  Patient's history, physical exam, diagnostic studies, and treatment plan were discussed thoroughly.  Procedure  Procedures     Meredith Marques PA-C  01/20/25 6020

## 2025-01-22 LAB — BACTERIA UR CULT: NORMAL

## 2025-04-08 ENCOUNTER — TELEPHONE (OUTPATIENT)
Dept: PRIMARY CARE | Facility: CLINIC | Age: 65
End: 2025-04-08
Payer: MEDICARE

## 2025-04-08 DIAGNOSIS — E03.9 ACQUIRED HYPOTHYROIDISM: ICD-10-CM

## 2025-04-08 RX ORDER — LEVOTHYROXINE SODIUM 88 UG/1
88 TABLET ORAL DAILY
Qty: 90 TABLET | Refills: 2 | Status: SHIPPED | OUTPATIENT
Start: 2025-04-08

## 2025-06-26 ENCOUNTER — HOSPITAL ENCOUNTER (EMERGENCY)
Facility: HOSPITAL | Age: 65
Discharge: HOME | End: 2025-06-26
Payer: MEDICARE

## 2025-06-26 ENCOUNTER — APPOINTMENT (OUTPATIENT)
Dept: RADIOLOGY | Facility: HOSPITAL | Age: 65
End: 2025-06-26
Payer: MEDICARE

## 2025-06-26 VITALS
HEIGHT: 68 IN | WEIGHT: 230 LBS | BODY MASS INDEX: 34.86 KG/M2 | SYSTOLIC BLOOD PRESSURE: 155 MMHG | DIASTOLIC BLOOD PRESSURE: 89 MMHG | RESPIRATION RATE: 18 BRPM | HEART RATE: 98 BPM | TEMPERATURE: 97.9 F | OXYGEN SATURATION: 94 %

## 2025-06-26 DIAGNOSIS — K52.9 COLITIS: Primary | ICD-10-CM

## 2025-06-26 LAB
ALBUMIN SERPL BCP-MCNC: 4.2 G/DL (ref 3.4–5)
ALP SERPL-CCNC: 74 U/L (ref 33–136)
ALT SERPL W P-5'-P-CCNC: 15 U/L (ref 7–45)
ANION GAP SERPL CALCULATED.3IONS-SCNC: 13 MMOL/L (ref 10–20)
APPEARANCE UR: CLEAR
AST SERPL W P-5'-P-CCNC: 19 U/L (ref 9–39)
BASOPHILS # BLD AUTO: 0.02 X10*3/UL (ref 0–0.1)
BASOPHILS NFR BLD AUTO: 0.2 %
BILIRUB SERPL-MCNC: 0.4 MG/DL (ref 0–1.2)
BILIRUB UR STRIP.AUTO-MCNC: NEGATIVE MG/DL
BUN SERPL-MCNC: 18 MG/DL (ref 6–23)
CALCIUM SERPL-MCNC: 8.5 MG/DL (ref 8.6–10.3)
CHLORIDE SERPL-SCNC: 103 MMOL/L (ref 98–107)
CO2 SERPL-SCNC: 25 MMOL/L (ref 21–32)
COLOR UR: COLORLESS
CREAT SERPL-MCNC: 0.78 MG/DL (ref 0.5–1.05)
EGFRCR SERPLBLD CKD-EPI 2021: 83 ML/MIN/1.73M*2
EOSINOPHIL # BLD AUTO: 0.12 X10*3/UL (ref 0–0.7)
EOSINOPHIL NFR BLD AUTO: 1.1 %
ERYTHROCYTE [DISTWIDTH] IN BLOOD BY AUTOMATED COUNT: 12.8 % (ref 11.5–14.5)
GLUCOSE SERPL-MCNC: 107 MG/DL (ref 74–99)
GLUCOSE UR STRIP.AUTO-MCNC: NORMAL MG/DL
HCT VFR BLD AUTO: 43.8 % (ref 36–46)
HGB BLD-MCNC: 13.9 G/DL (ref 12–16)
IMM GRANULOCYTES # BLD AUTO: 0.05 X10*3/UL (ref 0–0.7)
IMM GRANULOCYTES NFR BLD AUTO: 0.5 % (ref 0–0.9)
KETONES UR STRIP.AUTO-MCNC: NEGATIVE MG/DL
LEUKOCYTE ESTERASE UR QL STRIP.AUTO: NEGATIVE
LIPASE SERPL-CCNC: 40 U/L (ref 9–82)
LYMPHOCYTES # BLD AUTO: 1.04 X10*3/UL (ref 1.2–4.8)
LYMPHOCYTES NFR BLD AUTO: 9.4 %
MAGNESIUM SERPL-MCNC: 2.08 MG/DL (ref 1.6–2.4)
MCH RBC QN AUTO: 26.5 PG (ref 26–34)
MCHC RBC AUTO-ENTMCNC: 31.7 G/DL (ref 32–36)
MCV RBC AUTO: 83 FL (ref 80–100)
MONOCYTES # BLD AUTO: 0.94 X10*3/UL (ref 0.1–1)
MONOCYTES NFR BLD AUTO: 8.5 %
NEUTROPHILS # BLD AUTO: 8.87 X10*3/UL (ref 1.2–7.7)
NEUTROPHILS NFR BLD AUTO: 80.3 %
NITRITE UR QL STRIP.AUTO: NEGATIVE
NRBC BLD-RTO: 0 /100 WBCS (ref 0–0)
PH UR STRIP.AUTO: 6.5 [PH]
PLATELET # BLD AUTO: 231 X10*3/UL (ref 150–450)
POTASSIUM SERPL-SCNC: 3.7 MMOL/L (ref 3.5–5.3)
PROT SERPL-MCNC: 7.1 G/DL (ref 6.4–8.2)
PROT UR STRIP.AUTO-MCNC: NEGATIVE MG/DL
RBC # BLD AUTO: 5.25 X10*6/UL (ref 4–5.2)
RBC # UR STRIP.AUTO: NEGATIVE MG/DL
SODIUM SERPL-SCNC: 137 MMOL/L (ref 136–145)
SP GR UR STRIP.AUTO: 1.02
UROBILINOGEN UR STRIP.AUTO-MCNC: NORMAL MG/DL
WBC # BLD AUTO: 11 X10*3/UL (ref 4.4–11.3)

## 2025-06-26 PROCEDURE — 74177 CT ABD & PELVIS W/CONTRAST: CPT

## 2025-06-26 PROCEDURE — 96374 THER/PROPH/DIAG INJ IV PUSH: CPT | Mod: 59

## 2025-06-26 PROCEDURE — 83690 ASSAY OF LIPASE: CPT | Performed by: PHYSICIAN ASSISTANT

## 2025-06-26 PROCEDURE — 85025 COMPLETE CBC W/AUTO DIFF WBC: CPT | Performed by: PHYSICIAN ASSISTANT

## 2025-06-26 PROCEDURE — 96375 TX/PRO/DX INJ NEW DRUG ADDON: CPT

## 2025-06-26 PROCEDURE — 81003 URINALYSIS AUTO W/O SCOPE: CPT | Performed by: PHYSICIAN ASSISTANT

## 2025-06-26 PROCEDURE — 2500000004 HC RX 250 GENERAL PHARMACY W/ HCPCS (ALT 636 FOR OP/ED)

## 2025-06-26 PROCEDURE — 36415 COLL VENOUS BLD VENIPUNCTURE: CPT | Performed by: PHYSICIAN ASSISTANT

## 2025-06-26 PROCEDURE — 84075 ASSAY ALKALINE PHOSPHATASE: CPT | Performed by: PHYSICIAN ASSISTANT

## 2025-06-26 PROCEDURE — 74177 CT ABD & PELVIS W/CONTRAST: CPT | Mod: FOREIGN READ | Performed by: RADIOLOGY

## 2025-06-26 PROCEDURE — 99285 EMERGENCY DEPT VISIT HI MDM: CPT | Mod: 25

## 2025-06-26 PROCEDURE — 2500000001 HC RX 250 WO HCPCS SELF ADMINISTERED DRUGS (ALT 637 FOR MEDICARE OP)

## 2025-06-26 PROCEDURE — 2500000004 HC RX 250 GENERAL PHARMACY W/ HCPCS (ALT 636 FOR OP/ED): Performed by: PHYSICIAN ASSISTANT

## 2025-06-26 PROCEDURE — 83735 ASSAY OF MAGNESIUM: CPT | Performed by: PHYSICIAN ASSISTANT

## 2025-06-26 PROCEDURE — 96361 HYDRATE IV INFUSION ADD-ON: CPT

## 2025-06-26 PROCEDURE — 2550000001 HC RX 255 CONTRASTS

## 2025-06-26 RX ORDER — ONDANSETRON 4 MG/1
4 TABLET, FILM COATED ORAL EVERY 6 HOURS
Qty: 12 TABLET | Refills: 0 | Status: SHIPPED | OUTPATIENT
Start: 2025-06-26 | End: 2025-06-26

## 2025-06-26 RX ORDER — ONDANSETRON HYDROCHLORIDE 2 MG/ML
4 INJECTION, SOLUTION INTRAVENOUS ONCE
Status: COMPLETED | OUTPATIENT
Start: 2025-06-26 | End: 2025-06-26

## 2025-06-26 RX ORDER — PROCHLORPERAZINE MALEATE 10 MG
10 TABLET ORAL 2 TIMES DAILY PRN
Qty: 10 TABLET | Refills: 0 | Status: SHIPPED | OUTPATIENT
Start: 2025-06-26 | End: 2025-06-26

## 2025-06-26 RX ORDER — PROCHLORPERAZINE MALEATE 10 MG
10 TABLET ORAL 2 TIMES DAILY PRN
Qty: 10 TABLET | Refills: 0 | Status: SHIPPED | OUTPATIENT
Start: 2025-06-26 | End: 2025-07-01

## 2025-06-26 RX ORDER — TRAMADOL HYDROCHLORIDE 50 MG/1
50 TABLET, FILM COATED ORAL EVERY 6 HOURS PRN
Qty: 12 TABLET | Refills: 0 | Status: SHIPPED | OUTPATIENT
Start: 2025-06-26 | End: 2025-06-29

## 2025-06-26 RX ORDER — PROCHLORPERAZINE EDISYLATE 5 MG/ML
10 INJECTION INTRAMUSCULAR; INTRAVENOUS ONCE
Status: COMPLETED | OUTPATIENT
Start: 2025-06-26 | End: 2025-06-26

## 2025-06-26 RX ORDER — TRAMADOL HYDROCHLORIDE 50 MG/1
50 TABLET, FILM COATED ORAL ONCE
Status: COMPLETED | OUTPATIENT
Start: 2025-06-26 | End: 2025-06-26

## 2025-06-26 RX ORDER — TRAMADOL HYDROCHLORIDE 50 MG/1
50 TABLET, FILM COATED ORAL EVERY 6 HOURS PRN
Qty: 12 TABLET | Refills: 0 | Status: SHIPPED | OUTPATIENT
Start: 2025-06-26 | End: 2025-06-26

## 2025-06-26 RX ORDER — DICYCLOMINE HYDROCHLORIDE 20 MG/1
20 TABLET ORAL 2 TIMES DAILY
Qty: 20 TABLET | Refills: 0 | Status: SHIPPED | OUTPATIENT
Start: 2025-06-26 | End: 2025-06-26

## 2025-06-26 RX ORDER — DICYCLOMINE HYDROCHLORIDE 20 MG/1
20 TABLET ORAL 2 TIMES DAILY
Qty: 20 TABLET | Refills: 0 | Status: SHIPPED | OUTPATIENT
Start: 2025-06-26 | End: 2025-07-06

## 2025-06-26 RX ORDER — ONDANSETRON 4 MG/1
4 TABLET, FILM COATED ORAL EVERY 6 HOURS
Qty: 12 TABLET | Refills: 0 | Status: SHIPPED | OUTPATIENT
Start: 2025-06-26 | End: 2025-06-29

## 2025-06-26 RX ADMIN — SODIUM CHLORIDE 1000 ML: 900 INJECTION, SOLUTION INTRAVENOUS at 16:59

## 2025-06-26 RX ADMIN — ONDANSETRON 4 MG: 2 INJECTION INTRAMUSCULAR; INTRAVENOUS at 16:59

## 2025-06-26 RX ADMIN — TRAMADOL HYDROCHLORIDE 50 MG: 50 TABLET, COATED ORAL at 18:56

## 2025-06-26 RX ADMIN — PROCHLORPERAZINE EDISYLATE 10 MG: 5 INJECTION INTRAMUSCULAR; INTRAVENOUS at 19:30

## 2025-06-26 RX ADMIN — IOHEXOL 75 ML: 350 INJECTION, SOLUTION INTRAVENOUS at 17:56

## 2025-06-26 ASSESSMENT — PAIN - FUNCTIONAL ASSESSMENT: PAIN_FUNCTIONAL_ASSESSMENT: 0-10

## 2025-06-26 ASSESSMENT — PAIN SCALES - GENERAL: PAINLEVEL_OUTOF10: 8

## 2025-06-27 NOTE — ED PROVIDER NOTES
HPI   Chief Complaint   Patient presents with    Vomiting     Pt complains of abd pain, n/v, diarrhea x3 days.  Has blood in urine.  Hx of pancreatitis.        HPI  Patient is a 65-year-old female who presents to ED for chief complaint of abdominal pain, nausea vomiting and diarrhea x 3 days.  Patient also reports hematuria and history of pancreatitis.  She denies any fever or chills.  Denies any chest pain or shortness of breath.  Denies any recent travel, sick contacts, hospitalizations or surgeries.  She does endorse a history of several GI issues including complicated gallbladder surgery decades ago.  She has no other acute complaints.      Patient History   Medical History[1]  Surgical History[2]  Family History[3]  Social History[4]    Physical Exam   ED Triage Vitals [06/26/25 1645]   Temperature Heart Rate Respirations BP   36.6 °C (97.9 °F) 98 18 155/89      Pulse Ox Temp src Heart Rate Source Patient Position   94 % -- -- --      BP Location FiO2 (%)     -- --       Physical Exam  Vitals reviewed.   Constitutional:       General: She is not in acute distress.     Appearance: Normal appearance. She is not ill-appearing.   HENT:      Head: Normocephalic and atraumatic.   Eyes:      Extraocular Movements: Extraocular movements intact.   Cardiovascular:      Rate and Rhythm: Normal rate and regular rhythm.      Heart sounds: Normal heart sounds.   Pulmonary:      Effort: Pulmonary effort is normal.      Breath sounds: Normal breath sounds.   Abdominal:      Palpations: Abdomen is soft.      Tenderness: There is no abdominal tenderness.   Musculoskeletal:         General: Normal range of motion.      Cervical back: Normal range of motion and neck supple.   Skin:     General: Skin is warm and dry.   Neurological:      General: No focal deficit present.      Mental Status: She is alert and oriented to person, place, and time.   Psychiatric:         Mood and Affect: Mood normal.         Behavior: Behavior  "normal.    ED Course & MDM   Diagnoses as of 06/26/25 2019   Colitis                 No data recorded     Radha Coma Scale Score: 15 (06/26/25 1749 : Ana Cristina Chen RN)                           Medical Decision Making  Parts of this chart have been completed using voice recognition software. Please excuse any errors of transcription.  My thought process and reason for plan has been formulated from the time that I saw the patient until the time of disposition and is not specific to one specific moment during their visit and furthermore my MDM encompasses this entire chart and not only this text box.    HPI:   A medically appropriate HPI was obtained, outlined above.    Bhavana Jean is a  65 y.o. female    Chief Complaint   Patient presents with    Vomiting     Pt complains of abd pain, n/v, diarrhea x3 days.  Has blood in urine.  Hx of pancreatitis.        Medical History[5]    Surgical History[6]    Social History[7]    Family History[8]    Allergies[9]    Current Outpatient Medications   Medication Instructions    bepotastine (Bepreve) 1.5 % drops ophthalmic solution 1 drop, 2 times daily    clobetasol (Temovate) 0.05 % cream 1 Application, Topical, 2 times daily    dicyclomine (BENTYL) 20 mg, Every 6 hours PRN    dicyclomine (BENTYL) 20 mg, oral, 2 times daily    fluticasone (Flonase) 50 mcg/actuation nasal spray 2 sprays, Each Nostril, Daily    levothyroxine (SYNTHROID, LEVOXYL) 88 mcg, oral, Daily    ondansetron (ZOFRAN) 4 mg, oral, Every 6 hours    prochlorperazine (COMPAZINE) 10 mg, oral, 2 times daily PRN    traMADol (ULTRAM) 50 mg, oral, Every 6 hours PRN   for details    Exam:   Patient Vitals for the past 24 hrs:   BP Temp Pulse Resp SpO2 Height Weight   06/26/25 1645 155/89 36.6 °C (97.9 °F) 98 18 94 % 1.727 m (5' 8\") 104 kg (230 lb)       A medically appropriate exam performed, outlined above, given the known history and presentation.    EKG/Cardiac monitor:   If EKG was done and, it was " interpreted by attending physician, see their note for ED course for more detail.    Medications given during visit:  Medications   sodium chloride 0.9 % bolus 1,000 mL (0 mL intravenous Stopped 6/26/25 1749)   ondansetron (Zofran) injection 4 mg (4 mg intravenous Given 6/26/25 1659)   iohexol (OMNIPaque) 350 mg iodine/mL solution 75 mL (75 mL intravenous Given 6/26/25 1756)   traMADol (Ultram) tablet 50 mg (50 mg oral Given 6/26/25 1856)   prochlorperazine (Compazine) injection 10 mg (10 mg intravenous Given 6/26/25 1930)        Diagnostic/tests:  Labs Reviewed   CBC WITH AUTO DIFFERENTIAL - Abnormal       Result Value    WBC 11.0      nRBC 0.0      RBC 5.25 (*)     Hemoglobin 13.9      Hematocrit 43.8      MCV 83      MCH 26.5      MCHC 31.7 (*)     RDW 12.8      Platelets 231      Neutrophils % 80.3      Immature Granulocytes %, Automated 0.5      Lymphocytes % 9.4      Monocytes % 8.5      Eosinophils % 1.1      Basophils % 0.2      Neutrophils Absolute 8.87 (*)     Immature Granulocytes Absolute, Automated 0.05      Lymphocytes Absolute 1.04 (*)     Monocytes Absolute 0.94      Eosinophils Absolute 0.12      Basophils Absolute 0.02     COMPREHENSIVE METABOLIC PANEL - Abnormal    Glucose 107 (*)     Sodium 137      Potassium 3.7      Chloride 103      Bicarbonate 25      Anion Gap 13      Urea Nitrogen 18      Creatinine 0.78      eGFR 83      Calcium 8.5 (*)     Albumin 4.2      Alkaline Phosphatase 74      Total Protein 7.1      AST 19      Bilirubin, Total 0.4      ALT 15     URINALYSIS WITH REFLEX CULTURE AND MICROSCOPIC - Abnormal    Color, Urine Colorless (*)     Appearance, Urine Clear      Specific Gravity, Urine 1.025      pH, Urine 6.5      Protein, Urine NEGATIVE      Glucose, Urine Normal      Blood, Urine NEGATIVE      Ketones, Urine NEGATIVE      Bilirubin, Urine NEGATIVE      Urobilinogen, Urine Normal      Nitrite, Urine NEGATIVE      Leukocyte Esterase, Urine NEGATIVE     LIPASE - Normal     Lipase 40      Narrative:     Venipuncture immediately after or during the administration of Metamizole may lead to falsely low results. Testing should be performed immediately prior to Metamizole dosing.   MAGNESIUM - Normal    Magnesium 2.08     URINALYSIS WITH REFLEX CULTURE AND MICROSCOPIC    Narrative:     The following orders were created for panel order Urinalysis with Reflex Culture and Microscopic.  Procedure                               Abnormality         Status                     ---------                               -----------         ------                     Urinalysis with Reflex C...[[id number]]  Abnormal            Final result               Extra Urine Gray Tube[[id number]]                                                         Please view results for these tests on the individual orders.   EXTRA URINE GRAY TUBE        CT abdomen pelvis w IV contrast   Final Result   Changes which may suggest a mild small bowel enteritis. No evidence of   obstruction   Fusiform infrarenal abdominal aortic aneurysm, 3 cm, unchanged.   Signed by Leon Arechiga MD             [facility] Summary:  CT evidence of enteritis.  Will treat symptomatically.  Aftercare instructions provided.  Return precautions discussed.  Prescriptions ordered.    We have discussed the diagnosis and risks, and we agree with discharging home to follow-up with appropriate physician as directed. We also discussed returning to the Emergency Department immediately if new or worsening symptoms occur. We have discussed the symptoms which are most concerning that necessitate immediate return. Pt symptoms have been well controlled here and the patient is safe for discharge with appropriate outpatient follow up. The patient has verbalized understanding to return to ER without delay for new or worsening pains or for any other symptoms or concerns. I utilized the discharge clinical management tool provided [employer] to help estimate risk of  negative outcome for this patient.        Disposition:  ED Prescriptions       Medication Sig Dispense Start Date End Date Auth. Provider    ondansetron (Zofran) 4 mg tablet  (Status: Discontinued) Take 1 tablet (4 mg) by mouth every 6 hours for 3 days. 12 tablet 6/26/2025 6/26/2025 Lance Bauer PA-C    prochlorperazine (Compazine) 10 mg tablet  (Status: Discontinued) Take 1 tablet (10 mg) by mouth 2 times a day as needed for nausea or vomiting for up to 5 days. 10 tablet 6/26/2025 6/26/2025 Lance Bauer PA-C    traMADol (Ultram) 50 mg tablet  (Status: Discontinued) Take 1 tablet (50 mg) by mouth every 6 hours if needed for severe pain (7 - 10) for up to 3 days. 12 tablet 6/26/2025 6/26/2025 Lance Bauer PA-C    dicyclomine (Bentyl) 20 mg tablet  (Status: Discontinued) Take 1 tablet (20 mg) by mouth 2 times a day for 10 days. 20 tablet 6/26/2025 6/26/2025 Lance Bauer PA-C    prochlorperazine (Compazine) 10 mg tablet Take 1 tablet (10 mg) by mouth 2 times a day as needed for nausea or vomiting for up to 5 days. 10 tablet 6/26/2025 7/1/2025 Lance Bauer PA-C    traMADol (Ultram) 50 mg tablet Take 1 tablet (50 mg) by mouth every 6 hours if needed for severe pain (7 - 10) for up to 3 days. 12 tablet 6/26/2025 6/29/2025 Lance Bauer PA-C    ondansetron (Zofran) 4 mg tablet Take 1 tablet (4 mg) by mouth every 6 hours for 3 days. 12 tablet 6/26/2025 6/29/2025 Lance Bauer PA-C    dicyclomine (Bentyl) 20 mg tablet Take 1 tablet (20 mg) by mouth 2 times a day for 10 days. 20 tablet 6/26/2025 7/6/2025 Lance Bauer PA-C            All of the patient's questions were answered to the best of my ability. Patient states understanding that they have been screened for an emergency today and we have not found any etiology of symptoms that requires emergent treatment or admission to the hospital at this point. They understand that they have not had definitive care day and require follow-up for  treatment of their condition. They also state understanding that they may have an emergent condition that may potentially have not of detected at this visit and they must return to the emergency department if they develop any worsening of symptoms or new complaints.       Procedure  Procedures       [1]   Past Medical History:  Diagnosis Date    Acquired absence of both cervix and uterus     H/O: hysterectomy    Personal history of other endocrine, nutritional and metabolic disease     History of thyroid disease    Personal history of other specified conditions     History of blood loss   [2]   Past Surgical History:  Procedure Laterality Date    ADENOIDECTOMY  08/25/2014    Adenoidectomy    GALLBLADDER SURGERY  08/25/2014    Gallbladder Surgery    HYSTERECTOMY  08/25/2014    Hysterectomy    OTHER SURGICAL HISTORY  08/25/2014    Leg Repair    OTHER SURGICAL HISTORY  08/25/2014    Ear Surgery    TONSILLECTOMY  08/25/2014    Tonsillectomy   [3]   Family History  Problem Relation Name Age of Onset    Colon cancer Mother     [4]   Social History  Tobacco Use    Smoking status: Never    Smokeless tobacco: Never   Vaping Use    Vaping status: Never Used   Substance Use Topics    Alcohol use: Never    Drug use: Never   [5]   Past Medical History:  Diagnosis Date    Acquired absence of both cervix and uterus     H/O: hysterectomy    Personal history of other endocrine, nutritional and metabolic disease     History of thyroid disease    Personal history of other specified conditions     History of blood loss   [6]   Past Surgical History:  Procedure Laterality Date    ADENOIDECTOMY  08/25/2014    Adenoidectomy    GALLBLADDER SURGERY  08/25/2014    Gallbladder Surgery    HYSTERECTOMY  08/25/2014    Hysterectomy    OTHER SURGICAL HISTORY  08/25/2014    Leg Repair    OTHER SURGICAL HISTORY  08/25/2014    Ear Surgery    TONSILLECTOMY  08/25/2014    Tonsillectomy   [7]   Social History  Tobacco Use    Smoking status: Never     Smokeless tobacco: Never   Vaping Use    Vaping status: Never Used   Substance Use Topics    Alcohol use: Never    Drug use: Never   [8]   Family History  Problem Relation Name Age of Onset    Colon cancer Mother     [9]   Allergies  Allergen Reactions    Acetaminophen GI Upset, Other and Shortness of breath    Oxycodone Anaphylaxis and Hives    Oxycodone-Acetaminophen Anaphylaxis and Hives    Propoxyphene N-Acetaminophen Shortness of breath    Penicillins Magdaleno Bauer PA-C  06/26/25 2021

## 2025-08-19 DIAGNOSIS — Z12.31 ENCOUNTER FOR SCREENING MAMMOGRAM FOR BREAST CANCER: ICD-10-CM

## 2025-11-13 ENCOUNTER — APPOINTMENT (OUTPATIENT)
Dept: PRIMARY CARE | Facility: CLINIC | Age: 65
End: 2025-11-13
Payer: MEDICARE